# Patient Record
Sex: FEMALE | Race: WHITE | NOT HISPANIC OR LATINO | Employment: OTHER | URBAN - METROPOLITAN AREA
[De-identification: names, ages, dates, MRNs, and addresses within clinical notes are randomized per-mention and may not be internally consistent; named-entity substitution may affect disease eponyms.]

---

## 2022-03-10 ENCOUNTER — OFFICE VISIT (OUTPATIENT)
Dept: URGENT CARE | Facility: CLINIC | Age: 32
End: 2022-03-10
Payer: COMMERCIAL

## 2022-03-10 VITALS
OXYGEN SATURATION: 99 % | SYSTOLIC BLOOD PRESSURE: 144 MMHG | TEMPERATURE: 98 F | HEART RATE: 83 BPM | DIASTOLIC BLOOD PRESSURE: 79 MMHG | RESPIRATION RATE: 16 BRPM | BODY MASS INDEX: 24.99 KG/M2 | WEIGHT: 150 LBS | HEIGHT: 65 IN

## 2022-03-10 DIAGNOSIS — Z20.2 POSSIBLE EXPOSURE TO STD: Primary | ICD-10-CM

## 2022-03-10 LAB
BILIRUB UR QL STRIP: NEGATIVE
GLUCOSE UR QL STRIP: NEGATIVE
KETONES UR QL STRIP: NEGATIVE
LEUKOCYTE ESTERASE UR QL STRIP: NEGATIVE
PH, POC UA: 7.5 (ref 5–8)
POC BLOOD, URINE: POSITIVE
POC NITRATES, URINE: NEGATIVE
PROT UR QL STRIP: NEGATIVE
SP GR UR STRIP: 1 (ref 1–1.03)
UROBILINOGEN UR STRIP-ACNC: ABNORMAL (ref 0.1–1.1)

## 2022-03-10 PROCEDURE — 81003 URINALYSIS AUTO W/O SCOPE: CPT | Mod: QW,S$GLB,, | Performed by: NURSE PRACTITIONER

## 2022-03-10 PROCEDURE — 1159F PR MEDICATION LIST DOCUMENTED IN MEDICAL RECORD: ICD-10-PCS | Mod: CPTII,S$GLB,, | Performed by: NURSE PRACTITIONER

## 2022-03-10 PROCEDURE — 86592 SYPHILIS TEST NON-TREP QUAL: CPT | Performed by: NURSE PRACTITIONER

## 2022-03-10 PROCEDURE — 3078F PR MOST RECENT DIASTOLIC BLOOD PRESSURE < 80 MM HG: ICD-10-PCS | Mod: CPTII,S$GLB,, | Performed by: NURSE PRACTITIONER

## 2022-03-10 PROCEDURE — 81003 POCT URINALYSIS, DIPSTICK, AUTOMATED, W/O SCOPE: ICD-10-PCS | Mod: QW,S$GLB,, | Performed by: NURSE PRACTITIONER

## 2022-03-10 PROCEDURE — 87591 N.GONORRHOEAE DNA AMP PROB: CPT | Performed by: NURSE PRACTITIONER

## 2022-03-10 PROCEDURE — 1160F PR REVIEW ALL MEDS BY PRESCRIBER/CLIN PHARMACIST DOCUMENTED: ICD-10-PCS | Mod: CPTII,S$GLB,, | Performed by: NURSE PRACTITIONER

## 2022-03-10 PROCEDURE — 99203 OFFICE O/P NEW LOW 30 MIN: CPT | Mod: S$GLB,,, | Performed by: NURSE PRACTITIONER

## 2022-03-10 PROCEDURE — 3077F PR MOST RECENT SYSTOLIC BLOOD PRESSURE >= 140 MM HG: ICD-10-PCS | Mod: CPTII,S$GLB,, | Performed by: NURSE PRACTITIONER

## 2022-03-10 PROCEDURE — 3008F PR BODY MASS INDEX (BMI) DOCUMENTED: ICD-10-PCS | Mod: CPTII,S$GLB,, | Performed by: NURSE PRACTITIONER

## 2022-03-10 PROCEDURE — 1160F RVW MEDS BY RX/DR IN RCRD: CPT | Mod: CPTII,S$GLB,, | Performed by: NURSE PRACTITIONER

## 2022-03-10 PROCEDURE — 1159F MED LIST DOCD IN RCRD: CPT | Mod: CPTII,S$GLB,, | Performed by: NURSE PRACTITIONER

## 2022-03-10 PROCEDURE — 3008F BODY MASS INDEX DOCD: CPT | Mod: CPTII,S$GLB,, | Performed by: NURSE PRACTITIONER

## 2022-03-10 PROCEDURE — 99203 PR OFFICE/OUTPT VISIT, NEW, LEVL III, 30-44 MIN: ICD-10-PCS | Mod: S$GLB,,, | Performed by: NURSE PRACTITIONER

## 2022-03-10 PROCEDURE — 3078F DIAST BP <80 MM HG: CPT | Mod: CPTII,S$GLB,, | Performed by: NURSE PRACTITIONER

## 2022-03-10 PROCEDURE — 3077F SYST BP >= 140 MM HG: CPT | Mod: CPTII,S$GLB,, | Performed by: NURSE PRACTITIONER

## 2022-03-10 PROCEDURE — 87389 HIV-1 AG W/HIV-1&-2 AB AG IA: CPT | Performed by: NURSE PRACTITIONER

## 2022-03-10 PROCEDURE — 87491 CHLMYD TRACH DNA AMP PROBE: CPT | Performed by: NURSE PRACTITIONER

## 2022-03-10 RX ORDER — PHENAZOPYRIDINE HYDROCHLORIDE 100 MG/1
100 TABLET, FILM COATED ORAL 3 TIMES DAILY
COMMUNITY
Start: 2022-03-09 | End: 2024-03-19 | Stop reason: CLARIF

## 2022-03-10 RX ORDER — NITROFURANTOIN 25; 75 MG/1; MG/1
100 CAPSULE ORAL 2 TIMES DAILY
COMMUNITY
Start: 2022-03-09 | End: 2024-03-19 | Stop reason: CLARIF

## 2022-03-10 RX ORDER — UBROGEPANT 100 MG/1
100 TABLET ORAL
COMMUNITY
Start: 2022-02-22 | End: 2024-03-19 | Stop reason: CLARIF

## 2022-03-10 RX ORDER — DEXTROAMPHETAMINE SACCHARATE, AMPHETAMINE ASPARTATE MONOHYDRATE, DEXTROAMPHETAMINE SULFATE AND AMPHETAMINE SULFATE 7.5; 7.5; 7.5; 7.5 MG/1; MG/1; MG/1; MG/1
CAPSULE, EXTENDED RELEASE ORAL
COMMUNITY
Start: 2022-02-22 | End: 2024-03-19 | Stop reason: CLARIF

## 2022-03-10 RX ORDER — BUSPIRONE HYDROCHLORIDE 10 MG/1
10 TABLET ORAL 2 TIMES DAILY
COMMUNITY
Start: 2022-02-22 | End: 2024-03-19 | Stop reason: CLARIF

## 2022-03-10 NOTE — PROGRESS NOTES
"Subjective:       Patient ID: Marcy Romo is a 31 y.o. female.    Vitals:  height is 5' 5" (1.651 m) and weight is 68 kg (150 lb). Her oral temperature is 98.4 °F (36.9 °C). Her blood pressure is 144/79 (abnormal) and her pulse is 83. Her respiration is 16 and oxygen saturation is 99%.     Chief Complaint: exposed to std    Patient has been with a new partner and got a UTI.  She is concerned about STD exposure. She denies any current symptoms.       Constitution: Negative.   HENT: Negative.    Cardiovascular: Negative.    Respiratory: Negative.    Gastrointestinal: Negative.    Endocrine: negative.   Genitourinary: Negative.  Negative for frequency and urgency.   Musculoskeletal: Negative.    Skin: Negative.    Allergic/Immunologic: Negative.    Neurological: Negative.    Hematologic/Lymphatic: Negative.    Psychiatric/Behavioral: Negative.        Objective:      Physical Exam   Constitutional: She is oriented to person, place, and time. She appears well-developed.   HENT:   Head: Normocephalic and atraumatic.   Ears:   Right Ear: External ear normal.   Left Ear: External ear normal.   Nose: Nose normal. No nasal deformity. No epistaxis.   Mouth/Throat: Oropharynx is clear and moist and mucous membranes are normal.   Eyes: Lids are normal.   Neck: Trachea normal and phonation normal. Neck supple.   Cardiovascular: Normal pulses.   Pulmonary/Chest: Effort normal.   Abdominal: Normal appearance and bowel sounds are normal. She exhibits no distension. Soft. There is no abdominal tenderness.   Neurological: She is alert and oriented to person, place, and time.   Skin: Skin is warm, dry and intact.   Psychiatric: Her speech is normal and behavior is normal.   Nursing note and vitals reviewed.        Assessment:       1. Possible exposure to STD          Plan:       Patient Instructions   Increase condom use to prevent further occurance.  Notify sexual partners of the need for testing.  Complete ALL medication " prescribed.  NO sexual intercourse for 7 days after treatment. Retest to ensure infection has cleared-there are infections that require more agressive treatment.  Retest for all ini 6 weeks and again in 6 monts to ensure true negative results.  Today's testing will give no crediable information if you have unprotected sexual activities going forward. Syphillis cases are rising!  Gonorrhea has RESISTANT strains which is why repeat testing after treatment is important.  Gonorrhea may be present in multiple sites from just ONE area of exposure.  For those who have high risk sexual behaviors and are on Truvada for PrEP- you have additional protection against HIV ONLY.  REMEMBER WEAR CONDOMS AND GET TESTED OFTEN.          Possible exposure to STD  -     RPR  -     C. trachomatis/N. gonorrhoeae by AMP DNA  -     HIV 1/2 Ag/Ab (4th Gen)

## 2022-03-10 NOTE — PATIENT INSTRUCTIONS
Increase condom use to prevent further occurance.  Notify sexual partners of the need for testing.  Complete ALL medication prescribed.  NO sexual intercourse for 7 days after treatment. Retest to ensure infection has cleared-there are infections that require more agressive treatment.  Retest for all ini 6 weeks and again in 6 monts to ensure true negative results.  Today's testing will give no crediable information if you have unprotected sexual activities going forward. Syphillis cases are rising!  Gonorrhea has RESISTANT strains which is why repeat testing after treatment is important.  Gonorrhea may be present in multiple sites from just ONE area of exposure.  For those who have high risk sexual behaviors and are on Truvada for PrEP- you have additional protection against HIV ONLY.  REMEMBER WEAR CONDOMS AND GET TESTED OFTEN.

## 2022-03-12 LAB — RPR SER QL: NORMAL

## 2022-03-14 LAB — HIV 1+2 AB+HIV1 P24 AG SERPL QL IA: NEGATIVE

## 2022-03-15 LAB
C TRACH DNA SPEC QL NAA+PROBE: NOT DETECTED
N GONORRHOEA DNA SPEC QL NAA+PROBE: NOT DETECTED

## 2023-02-06 ENCOUNTER — OFFICE VISIT (OUTPATIENT)
Dept: URGENT CARE | Facility: CLINIC | Age: 33
End: 2023-02-06
Payer: COMMERCIAL

## 2023-02-06 VITALS
HEIGHT: 65 IN | HEART RATE: 86 BPM | WEIGHT: 150 LBS | SYSTOLIC BLOOD PRESSURE: 127 MMHG | TEMPERATURE: 97 F | OXYGEN SATURATION: 99 % | DIASTOLIC BLOOD PRESSURE: 75 MMHG | BODY MASS INDEX: 24.99 KG/M2

## 2023-02-06 DIAGNOSIS — J02.9 SORE THROAT: ICD-10-CM

## 2023-02-06 DIAGNOSIS — J06.9 VIRAL URI WITH COUGH: Primary | ICD-10-CM

## 2023-02-06 LAB
CTP QC/QA: YES
SARS-COV-2 AG RESP QL IA.RAPID: NEGATIVE

## 2023-02-06 PROCEDURE — 3074F SYST BP LT 130 MM HG: CPT | Mod: CPTII,S$GLB,, | Performed by: PHYSICIAN ASSISTANT

## 2023-02-06 PROCEDURE — 87811 SARS-COV-2 COVID19 W/OPTIC: CPT | Mod: QW,S$GLB,, | Performed by: PHYSICIAN ASSISTANT

## 2023-02-06 PROCEDURE — 3008F PR BODY MASS INDEX (BMI) DOCUMENTED: ICD-10-PCS | Mod: CPTII,S$GLB,, | Performed by: PHYSICIAN ASSISTANT

## 2023-02-06 PROCEDURE — 3074F PR MOST RECENT SYSTOLIC BLOOD PRESSURE < 130 MM HG: ICD-10-PCS | Mod: CPTII,S$GLB,, | Performed by: PHYSICIAN ASSISTANT

## 2023-02-06 PROCEDURE — 1159F MED LIST DOCD IN RCRD: CPT | Mod: CPTII,S$GLB,, | Performed by: PHYSICIAN ASSISTANT

## 2023-02-06 PROCEDURE — 3078F PR MOST RECENT DIASTOLIC BLOOD PRESSURE < 80 MM HG: ICD-10-PCS | Mod: CPTII,S$GLB,, | Performed by: PHYSICIAN ASSISTANT

## 2023-02-06 PROCEDURE — 87811 SARS CORONAVIRUS 2 ANTIGEN POCT, MANUAL READ: ICD-10-PCS | Mod: QW,S$GLB,, | Performed by: PHYSICIAN ASSISTANT

## 2023-02-06 PROCEDURE — 1160F PR REVIEW ALL MEDS BY PRESCRIBER/CLIN PHARMACIST DOCUMENTED: ICD-10-PCS | Mod: CPTII,S$GLB,, | Performed by: PHYSICIAN ASSISTANT

## 2023-02-06 PROCEDURE — 99213 PR OFFICE/OUTPT VISIT, EST, LEVL III, 20-29 MIN: ICD-10-PCS | Mod: S$GLB,,, | Performed by: PHYSICIAN ASSISTANT

## 2023-02-06 PROCEDURE — 1160F RVW MEDS BY RX/DR IN RCRD: CPT | Mod: CPTII,S$GLB,, | Performed by: PHYSICIAN ASSISTANT

## 2023-02-06 PROCEDURE — 1159F PR MEDICATION LIST DOCUMENTED IN MEDICAL RECORD: ICD-10-PCS | Mod: CPTII,S$GLB,, | Performed by: PHYSICIAN ASSISTANT

## 2023-02-06 PROCEDURE — 3008F BODY MASS INDEX DOCD: CPT | Mod: CPTII,S$GLB,, | Performed by: PHYSICIAN ASSISTANT

## 2023-02-06 PROCEDURE — 3078F DIAST BP <80 MM HG: CPT | Mod: CPTII,S$GLB,, | Performed by: PHYSICIAN ASSISTANT

## 2023-02-06 PROCEDURE — 99213 OFFICE O/P EST LOW 20 MIN: CPT | Mod: S$GLB,,, | Performed by: PHYSICIAN ASSISTANT

## 2023-02-06 RX ORDER — PREDNISONE 10 MG/1
TABLET ORAL
Qty: 11 TABLET | Refills: 0 | OUTPATIENT
Start: 2023-02-06 | End: 2024-03-20

## 2023-02-06 RX ORDER — DULOXETIN HYDROCHLORIDE 30 MG/1
30 CAPSULE, DELAYED RELEASE ORAL DAILY
COMMUNITY
Start: 2022-11-25

## 2023-02-06 RX ORDER — BENZONATATE 200 MG/1
200 CAPSULE ORAL 3 TIMES DAILY PRN
Qty: 30 CAPSULE | Refills: 0 | Status: SHIPPED | OUTPATIENT
Start: 2023-02-06 | End: 2023-02-16

## 2023-02-06 RX ORDER — AZELASTINE 1 MG/ML
1 SPRAY, METERED NASAL 2 TIMES DAILY
Qty: 30 ML | Refills: 0 | Status: SHIPPED | OUTPATIENT
Start: 2023-02-06 | End: 2024-03-19 | Stop reason: CLARIF

## 2023-02-06 NOTE — PROGRESS NOTES
"Subjective:       Patient ID: Marcy Romo is a 32 y.o. female.    Vitals:  height is 5' 5" (1.651 m) and weight is 68 kg (150 lb). Her temperature is 97.4 °F (36.3 °C). Her blood pressure is 127/75 and her pulse is 86. Her oxygen saturation is 99%.     Chief Complaint: Sore Throat    Sore throat, cough, congestion, fever 102- 2-3 days ago   Patient has been exposed to COVID at work recently  Patient has not tried any OTC medications for relief.     Other  This is a new problem. The current episode started in the past 7 days. The problem has been gradually worsening. Associated symptoms include congestion, coughing, a fever, headaches and a sore throat. She has tried nothing for the symptoms. The treatment provided no relief.     Constitution: Positive for fever.   HENT:  Positive for congestion and sore throat.    Respiratory:  Positive for cough. Negative for shortness of breath.    Neurological:  Positive for headaches.     Objective:      Physical Exam   Constitutional: She does not appear ill. No distress.   HENT:   Head: Normocephalic and atraumatic.   Ears:   Right Ear: Tympanic membrane, external ear and ear canal normal.   Left Ear: Tympanic membrane, external ear and ear canal normal.   Mouth/Throat: Mucous membranes are moist. No oropharyngeal exudate or posterior oropharyngeal erythema. Oropharynx is clear.   Eyes: Conjunctivae are normal. Right eye exhibits no discharge. Left eye exhibits no discharge. Extraocular movement intact   Cardiovascular: Normal rate, regular rhythm and normal heart sounds.   No murmur heard.  Pulmonary/Chest: Effort normal and breath sounds normal. She has no wheezes. She has no rhonchi. She has no rales.   Abdominal: Normal appearance.   Musculoskeletal: Normal range of motion.         General: Normal range of motion.   Neurological: no focal deficit. She is alert.   Skin: Skin is warm, dry and not pale. jaundice  Psychiatric: Her behavior is normal. Mood, judgment and " thought content normal.   Nursing note and vitals reviewed.      Assessment:       1. Viral URI with cough    2. Sore throat          Plan:         Viral URI with cough    Sore throat  -     SARS Coronavirus 2 Antigen, POCT Manual Read    Results for orders placed or performed in visit on 02/06/23   SARS Coronavirus 2 Antigen, POCT Manual Read   Result Value Ref Range    SARS Coronavirus 2 Antigen Negative Negative     Acceptable Yes         Other orders  -     predniSONE (DELTASONE) 10 MG tablet; Take 40mg for 1 days, take 30mg for 1 days, take 20mg for 1 days, take 10mg for 2 days  Dispense: 11 tablet; Refill: 0  -     azelastine (ASTELIN) 137 mcg (0.1 %) nasal spray; 1 spray (137 mcg total) by Nasal route 2 (two) times daily.  Dispense: 30 mL; Refill: 0  -     benzonatate (TESSALON) 200 MG capsule; Take 1 capsule (200 mg total) by mouth 3 (three) times daily as needed for Cough.  Dispense: 30 capsule; Refill: 0    Viral Upper Respiratory Infection Discharge Instructions, Adult   About this topic   You have an upper respiratory infection or URI. A URI can affect your nose, throat, ears, and sinuses. A virus is the cause of almost all URIs and antibiotics will not help you feel better more quickly. The common cold is an example of a viral URI.  URIs are easy to spread from person to person, most often through coughing or sneezing. A URI will almost always get better in a week or two without any treatment.     What care is needed at home?   Ask your doctor what you need to do when you go home. Make sure you ask questions if you do not understand what the doctor says.  If you smoke, try to quit. Your doctor or nurse can help.  Drink lots of fluids like water, juice, or broth. This will help replace any fluids lost if you have a runny nose or fever. Warm tea or soup can help soothe a sore throat.  If the air in your home feels dry, use a cool mist humidifier. This can help a stuffy nose and make it  easier to breathe.  You can also use saline nose drops to relieve stuffiness.  If you decide to take over-the-counter cough or cold medicines, follow the directions on the label carefully. Be sure you do not take more than 1 medicine that contains acetaminophen. Also, if you have a heart problem or high blood pressure, check with your doctor before you take any of these medicines.  Wash your hands often. Cough or sneeze into a tissue or your elbow instead of your hands. This will help keep others healthy.  What follow-up care is needed?   Your doctor may ask you to make visits to the office to check on your progress. Be sure to keep these visits.  What drugs may be needed?   The doctor may order drugs to:  Open up the tubes of your lungs  Treat viral infection  Relieve or stop coughing  Help with pain from a sore throat  Relieve runny and stuffy nose  Provide oxygen  Will physical activity be limited?   You need to rest for a few days to let your body recover from the infection.  What changes to diet are needed?   Eat soft foods like soup if swallowing is too painful.  What problems could happen?   Asthma attack  Sinus infections  Lung problems like pneumonia and bronchitis  Severe fluid loss. This is dehydration.  What can be done to prevent this health problem?   Wash your hands often with soap and water for at least 20 seconds, especially after coughing or sneezing. Alcohol-based hand sanitizers also work to kill the virus.  If you are sick, cover your mouth and nose with tissue when you cough or sneeze. You can also cough into your elbow. Throw away tissues in the trash and wash your hands after touching used tissues.  Do not get too close (kissing, hugging) to people who are sick.  Do not share towels or hankies with anyone who is sick. Clean commonly handled things like door handles, remotes, toys, and phones. Wipe them with a disinfectant.  Stay away from crowded places.  Cover your nose and mouth when you  sneeze or cough.  Take vitamin C to help build up your body's ability to fight disease.  Get a flu shot each year.  When do I need to call the doctor?   You have trouble breathing when talking or sitting still.  You have a fever of 100.4°F (38°C) or higher for several days, chills, a very bad sore throat, or ear or sinus pain.  You develop a new fever after several days of feeling the same or improving.  You develop chest pain when you cough.  You have a cough that lasts more than 10 days.  You cough up blood, or the color of the mucus you cough up changes.  Teach Back: Helping You Understand   The Teach Back Method helps you understand the information we are giving you. After you talk with the staff, tell them in your own words what you learned. This helps to make sure the staff has described each thing clearly. It also helps to explain things that may have been confusing. Before going home, make sure you can do these:  I can tell you about my condition.  I can tell you what may help ease my signs.  I can tell you what I will do if I have a fever, chills, breathing very fast, or trouble breathing.  Where can I learn more?   American Lung Association  https://www.lung.org/blog/can-you-exercise-with-a-cold   American Lung Association  https://www.lung.org/lung-health-diseases/lung-disease-lookup/influenza/facts-about-the-common-cold   NHS Choices  https://www.nhs.uk/conditions/respiratory-tract-infection/   UpToDate  https://www.uptodate.com/contents/the-common-cold-in-adults-beyond-the-basics   Last Reviewed Date   2021-06-08  Consumer Information Use and Disclaimer   This information is not specific medical advice and does not replace information you receive from your health care provider. This is only a brief summary of general information. It does NOT include all information about conditions, illnesses, injuries, tests, procedures, treatments, therapies, discharge instructions or life-style choices that may apply  to you. You must talk with your health care provider for complete information about your health and treatment options. This information should not be used to decide whether or not to accept your health care providers advice, instructions or recommendations. Only your health care provider has the knowledge and training to provide advice that is right for you.  Copyright   Copyright © 2021 [employer]To[employer] and its affiliates and/or licensors. All rights reserved.

## 2023-03-23 ENCOUNTER — OFFICE VISIT (OUTPATIENT)
Dept: URGENT CARE | Facility: CLINIC | Age: 33
End: 2023-03-23
Payer: MEDICAID

## 2023-03-23 VITALS
RESPIRATION RATE: 18 BRPM | DIASTOLIC BLOOD PRESSURE: 83 MMHG | SYSTOLIC BLOOD PRESSURE: 127 MMHG | WEIGHT: 140 LBS | BODY MASS INDEX: 23.32 KG/M2 | OXYGEN SATURATION: 98 % | HEIGHT: 65 IN | HEART RATE: 86 BPM | TEMPERATURE: 98 F

## 2023-03-23 DIAGNOSIS — A64 STI (SEXUALLY TRANSMITTED INFECTION): Primary | ICD-10-CM

## 2023-03-23 DIAGNOSIS — Z20.2 POTENTIAL EXPOSURE TO STD: ICD-10-CM

## 2023-03-23 LAB
BILIRUB UR QL STRIP: NEGATIVE
GLUCOSE UR QL STRIP: NEGATIVE
KETONES UR QL STRIP: NEGATIVE
LEUKOCYTE ESTERASE UR QL STRIP: NEGATIVE
PH, POC UA: 8
POC BLOOD, URINE: NEGATIVE
POC NITRATES, URINE: NEGATIVE
PROT UR QL STRIP: NEGATIVE
SP GR UR STRIP: 1.01 (ref 1–1.03)
UROBILINOGEN UR STRIP-ACNC: NORMAL (ref 0.1–1.1)

## 2023-03-23 PROCEDURE — 87591 N.GONORRHOEAE DNA AMP PROB: CPT | Performed by: FAMILY MEDICINE

## 2023-03-23 PROCEDURE — 99203 PR OFFICE/OUTPT VISIT, NEW, LEVL III, 30-44 MIN: ICD-10-PCS | Mod: S$GLB,,, | Performed by: FAMILY MEDICINE

## 2023-03-23 PROCEDURE — 81003 POCT URINALYSIS, DIPSTICK, AUTOMATED, W/O SCOPE: ICD-10-PCS | Mod: QW,S$GLB,, | Performed by: FAMILY MEDICINE

## 2023-03-23 PROCEDURE — 81003 URINALYSIS AUTO W/O SCOPE: CPT | Mod: QW,S$GLB,, | Performed by: FAMILY MEDICINE

## 2023-03-23 PROCEDURE — 99203 OFFICE O/P NEW LOW 30 MIN: CPT | Mod: S$GLB,,, | Performed by: FAMILY MEDICINE

## 2023-03-23 RX ORDER — DOXYCYCLINE HYCLATE 100 MG
100 TABLET ORAL DAILY
Qty: 6 TABLET | Refills: 0 | Status: SHIPPED | OUTPATIENT
Start: 2023-03-23 | End: 2023-03-28

## 2023-03-23 RX ORDER — CEFTRIAXONE 250 MG/1
250 INJECTION, POWDER, FOR SOLUTION INTRAMUSCULAR; INTRAVENOUS
Status: COMPLETED | OUTPATIENT
Start: 2023-03-23 | End: 2023-03-23

## 2023-03-23 RX ORDER — DROSPIRENONE AND ETHINYL ESTRADIOL 0.03MG-3MG
1 KIT ORAL
COMMUNITY
Start: 2023-03-09 | End: 2024-03-19 | Stop reason: CLARIF

## 2023-03-23 RX ADMIN — CEFTRIAXONE 250 MG: 250 INJECTION, POWDER, FOR SOLUTION INTRAMUSCULAR; INTRAVENOUS at 03:03

## 2023-03-23 NOTE — PROGRESS NOTES
"Subjective:       Patient ID: Marcy Romo is a 32 y.o. female.    Vitals:  height is 5' 5" (1.651 m) and weight is 63.5 kg (140 lb). Her oral temperature is 98 °F (36.7 °C). Her blood pressure is 127/83 and her pulse is 86. Her respiration is 18 and oxygen saturation is 98%.     Chief Complaint: Exposure to STD    This is a 32 y.o. female who presents today with a chief complaint of  exposure to STI    Exposure to STD   The patient's pertinent negatives include no discharge, dyspareunia, dysuria, genital itching, genital lesions, genital rash or pelvic pain. This is a new problem. The current episode started yesterday. The problem has been unchanged. The vaginal discharge was normal. Associate symptoms include urinary frequency. Pertinent negatives include no abdominal pain, anorexia, diaphoresis, fever, genital odor, rectal pain or sore throat. She has tried nothing for the symptoms. The treatment provided no relief.     Constitution: Negative for sweating and fever.   HENT:  Negative for sore throat.    Gastrointestinal:  Negative for abdominal pain and rectal pain.   Genitourinary:  Positive for frequency. Negative for dysuria and pelvic pain.     Objective:      Physical Exam   Constitutional: She is oriented to person, place, and time. She appears well-developed. She is cooperative.  Non-toxic appearance. She does not appear ill. No distress.   HENT:   Head: Normocephalic and atraumatic.   Ears:   Right Ear: Hearing, tympanic membrane, external ear and ear canal normal.   Left Ear: Hearing, tympanic membrane and external ear normal.   Nose: Nose normal. No mucosal edema, rhinorrhea or nasal deformity. No epistaxis. Right sinus exhibits no maxillary sinus tenderness and no frontal sinus tenderness. Left sinus exhibits no maxillary sinus tenderness and no frontal sinus tenderness.   Mouth/Throat: Uvula is midline, oropharynx is clear and moist and mucous membranes are normal. No trismus in the jaw. Normal " dentition. No uvula swelling. No oropharyngeal exudate, posterior oropharyngeal edema or posterior oropharyngeal erythema.   Eyes: Conjunctivae and lids are normal. No scleral icterus.   Neck: Trachea normal and phonation normal. Neck supple. No edema present. No erythema present. No neck rigidity present.   Cardiovascular: Normal rate, regular rhythm, normal heart sounds and normal pulses.   Pulmonary/Chest: Effort normal and breath sounds normal. No respiratory distress. She has no decreased breath sounds. She has no rhonchi.   Abdominal: Normal appearance and bowel sounds are normal. She exhibits no distension. Soft. There is no abdominal tenderness.   Musculoskeletal: Normal range of motion.         General: No deformity. Normal range of motion.   Neurological: She is alert and oriented to person, place, and time. She exhibits normal muscle tone. Coordination normal.   Skin: Skin is warm, dry, intact, not diaphoretic and not pale.   Psychiatric: Her speech is normal and behavior is normal. Judgment and thought content normal.   Nursing note and vitals reviewed.      Assessment:       1. STI (sexually transmitted infection)    2. Potential exposure to STD          Plan:         STI (sexually transmitted infection)  -     POCT Urinalysis, Dipstick, Automated, W/O Scope  -     Cancel: C. trachomatis/N. gonorrhoeae by AMP DNA [facility]; Urine  -     Cancel: NuSwab Vaginitis (VG)  -     C. trachomatis/N. gonorrhoeae by AMP DNA [facility]; Urine    Potential exposure to STD  -     cefTRIAXone injection 250 mg  -     doxycycline (VIBRA-TABS) 100 MG tablet; Take 1 tablet (100 mg total) by mouth once daily. Take one pill twice daily on day 1 then once daily thereafter for 5 days  Dispense: 6 tablet; Refill: 0           Medical Decision Making:   Urgent Care Management:  Pt would like treatment due to her partner being treated before his test came back which then invalidated his results from his subsequent test

## 2023-03-24 LAB
C TRACH DNA SPEC QL NAA+PROBE: NOT DETECTED
N GONORRHOEA DNA SPEC QL NAA+PROBE: NOT DETECTED

## 2023-03-25 ENCOUNTER — TELEPHONE (OUTPATIENT)
Dept: URGENT CARE | Facility: CLINIC | Age: 33
End: 2023-03-25
Payer: MEDICAID

## 2023-03-25 NOTE — TELEPHONE ENCOUNTER
Contacted patient and discussed negative gonorrhea/chlamydia results. No questions or concerns at this time.

## 2024-03-19 PROBLEM — G89.29 CHRONIC HEADACHES: Status: ACTIVE | Noted: 2024-03-19

## 2024-03-19 PROBLEM — R56.9 OBSERVED SEIZURE-LIKE ACTIVITY: Status: ACTIVE | Noted: 2024-03-19

## 2024-03-19 PROBLEM — R51.9 CHRONIC HEADACHES: Status: ACTIVE | Noted: 2024-03-19

## 2024-03-19 PROBLEM — R74.01 TRANSAMINITIS: Status: ACTIVE | Noted: 2024-03-19

## 2024-03-19 PROBLEM — F41.9 ANXIETY DISORDER: Status: ACTIVE | Noted: 2024-03-19

## 2024-03-20 PROBLEM — R79.89 ELEVATED LFTS: Status: ACTIVE | Noted: 2024-03-20

## 2024-03-20 PROBLEM — R74.8 ELEVATED LIVER ENZYMES: Status: ACTIVE | Noted: 2024-03-20

## 2024-03-20 PROBLEM — F44.5 CONVERSION DISORDER WITH ATTACKS OR SEIZURES: Status: ACTIVE | Noted: 2024-03-20

## 2024-05-03 NOTE — PROGRESS NOTES
PATIENT: Marcy Romo  MRN: [id number]  DATE: 5/6/2024    Diagnosis:   1. Elevated ferritin    2. Elevated hemoglobin    3. Lupus    4. Positive STEVAN (antinuclear antibody)    5. Elevated liver enzymes    6. Thrombocytosis, unspecified    7. Advance care planning      Chief Complaint: abnormal labs    Oncologic History:      Oncologic History     Oncologic Treatment     Pathology       Subjective:    History of Present Illness: Ms. Romo is a 33 y.o. female who presents for evaluation and management of abnormal labs.    - she states the referral is for intermittently elevated ferritin/hemoglobin. She states she was recently diagnosed with lupus and started plaquenil. She reports having lymphadenopathy that waxes and wanes, in her groin area.    - she denies fever, unintentional weight loss. She endorses night sweats.    - today, she endorses fatigue, menorrhagia, intermittent chest pain, nausea, vomiting, diarrhea, left leg pain, migraines      Past medical, surgical, family, and social histories have been reviewed and updated below.    Past Medical History:   Past Medical History:   Diagnosis Date    ADHD (attention deficit hyperactivity disorder)     Allergy     Anxiety     Asthma     Depression     Hyperlipidemia        Past Surgical History:   Past Surgical History:   Procedure Laterality Date    APPENDECTOMY      HAND SURGERY Right     HYSTERECTOMY      KNEE ARTHROSCOPY W/ ACL RECONSTRUCTION         Family History:   Family History   Problem Relation Name Age of Onset    Arthritis Mother      Alcohol abuse Mother      Drug abuse Mother      COPD Mother      Mental illness Mother      Hypertension Mother      Alcohol abuse Father      Heart disease Father      Diabetes Father         Social History:  reports that she has never smoked. She has been exposed to tobacco smoke. She has never used smokeless tobacco. She reports that she does not currently use alcohol. She reports current drug use.    Allergies:  Review of  "patient's allergies indicates:   Allergen Reactions    Cymbalta [duloxetine] Other (See Comments)     "Makes me crazy"    Topamax [topiramate] Other (See Comments)     "Makes me crazy"    Tramadol Nausea And Vomiting       Medications:  Current Outpatient Medications   Medication Sig Dispense Refill    celecoxib (CELEBREX) 200 MG capsule Take 1 capsule (200 mg total) by mouth daily as needed for Pain (pain). 90 capsule 0    citalopram (CELEXA) 20 MG tablet Take 20 mg by mouth once daily.      clonazePAM (KLONOPIN) 1 MG tablet Take 1 mg by mouth nightly as needed for Anxiety.      dextroamphetamine-amphetamine 30 mg Tab Take 30 mg by mouth 2 (two) times daily.      fluticasone propionate (FLONASE) 50 mcg/actuation nasal spray 1 spray by Each Nostril route once daily.      gabapentin (NEURONTIN) 100 MG capsule Take 1 capsule (100 mg total) by mouth 3 (three) times daily as needed (nerve pain). 270 capsule 0    hydroxychloroquine (PLAQUENIL) 200 mg tablet Take 1 tablet (200 mg total) by mouth once daily. 90 tablet 0    lamoTRIgine (LAMICTAL) 25 MG tablet Take 50 mg by mouth 2 (two) times daily.      linaCLOtide (LINZESS) 72 mcg Cap capsule Take 1 capsule (72 mcg total) by mouth daily as needed (constipation). 90 capsule 0    meloxicam (MOBIC) 15 MG tablet Take 15 mg by mouth once daily.      montelukast (SINGULAIR) 10 mg tablet Take 10 mg by mouth every evening.      NURTEC 75 mg odt Take 75 mg by mouth daily as needed for Migraine.      omeprazole (PRILOSEC) 20 MG capsule Take 1 capsule (20 mg total) by mouth once daily. 90 capsule 0    ondansetron (ZOFRAN-ODT) 4 MG TbDL Take 4 mg by mouth every 8 (eight) hours as needed (nausea).      prazosin (MINIPRESS) 1 MG Cap Take 1 mg by mouth every evening.      rizatriptan (MAXALT) 10 MG tablet Take 10 mg by mouth once as needed for Migraine.      sumatriptan (IMITREX) 100 MG tablet Take 100 mg by mouth daily as needed for Migraine.      VENTOLIN HFA 90 mcg/actuation inhaler " Inhale 1-2 puffs into the lungs every 6 (six) hours as needed for Wheezing or Shortness of Breath.       No current facility-administered medications for this visit.       Review of Systems   Constitutional:  Positive for appetite change and unexpected weight change.   HENT:  Negative for mouth sores.    Eyes:  Positive for visual disturbance.   Respiratory:  Negative for cough and shortness of breath.    Cardiovascular:  Positive for chest pain.   Gastrointestinal:  Positive for abdominal pain, diarrhea and vomiting.   Genitourinary:  Negative for frequency.   Musculoskeletal:  Positive for back pain.   Skin:  Positive for rash.   Neurological:  Positive for headaches.   Hematological:  Positive for adenopathy.   Psychiatric/Behavioral:  The patient is nervous/anxious.        ECOG Performance Status:   ECOG SCORE    1 - Restricted in strenuous activity-ambulatory and able to carry out work of a light nature         Objective:      Vitals:   Vitals:    05/06/24 0834   BP: 131/89   BP Location: Right arm   Patient Position: Sitting   BP Method: Medium (Automatic)   Pulse: 85   Resp: 18   SpO2: 98%   Weight: 68.8 kg (151 lb 10.8 oz)     BMI: Body mass index is 25.24 kg/m².    Physical Exam  Vitals and nursing note reviewed.   Constitutional:       Appearance: She is well-developed.   HENT:      Head: Normocephalic and atraumatic.   Eyes:      Pupils: Pupils are equal, round, and reactive to light.   Cardiovascular:      Rate and Rhythm: Normal rate and regular rhythm.   Pulmonary:      Effort: Pulmonary effort is normal.      Breath sounds: Normal breath sounds.   Abdominal:      General: Bowel sounds are normal.      Palpations: Abdomen is soft.   Musculoskeletal:         General: Normal range of motion.      Cervical back: Normal range of motion and neck supple.   Lymphadenopathy:      Cervical: Cervical adenopathy present.      Lower Body: Right inguinal adenopathy (pea-sized lymph node palpated) present.   Skin:      General: Skin is warm and dry.   Neurological:      Mental Status: She is alert and oriented to person, place, and time.   Psychiatric:         Behavior: Behavior normal.         Thought Content: Thought content normal.         Judgment: Judgment normal.         Laboratory Data:  Labs have been reviewed.    2/20/24:      2/28/24:      3/25/24:          Imaging:    Assessment:       1. Elevated hemoglobin    2. Lupus    3. Positive STEVAN (antinuclear antibody)    4. Elevated liver enzymes    5. Thrombocytosis, unspecified    6. Advance care planning           Plan:     Elevated ferritin / elevated hemoglobin / thrombocytosis / elevated liver enzymes / lupus / immunodeficiency due to drugs / lymphadenopathy  - I have reviewed her chart.  - complicated medical history. She has multiple issues, including lupus/ lymphadenopathy, elevated liver enzymes, mildly elevated ferritin, intermittent thrombocytosis, history of seizures. She also states she has myositis.  - I think we can say confidently that she has lupus. Unclear if the other issues are related to this or separate entities  - she is currently taking prednisone 10mg TID.  - I will perform a broad laboratory workup.  - I will contact her with results of testing.    2. Advance Care Planning     Date: 05/06/2024    Power of   I initiated the process of voluntary advance care planning today and explained the importance of this process to the patient.  I introduced the concept of advance directives to the patient, as well. Then the patient received detailed information about the importance of designating a Health Care Power of  (HCPOA). She was also instructed to communicate with this person about their wishes for future healthcare, should she become sick and lose decision-making capacity. The patient has not previously appointed a HCPOA. After our discussion, the patient has decided to complete a HCPOA and has appointed her  partner Nataliya Tran  ([phone]) . I spent a total time of 16 minutes discussing this issue with the patient.          - I will contact her with results of testing.    Ethan Talamantes M.D.  Hematology/Oncology  [facility] - [address], [address]  [place] [address]  Phone: [phone]  Fax: [phone]

## 2024-05-06 ENCOUNTER — OFFICE VISIT (OUTPATIENT)
Dept: HEMATOLOGY/ONCOLOGY | Facility: CLINIC | Age: 34
End: 2024-05-06
Payer: MEDICAID

## 2024-05-06 VITALS
WEIGHT: 151.69 LBS | HEART RATE: 85 BPM | BODY MASS INDEX: 25.24 KG/M2 | DIASTOLIC BLOOD PRESSURE: 89 MMHG | OXYGEN SATURATION: 98 % | RESPIRATION RATE: 18 BRPM | SYSTOLIC BLOOD PRESSURE: 131 MMHG

## 2024-05-06 DIAGNOSIS — D75.839 THROMBOCYTOSIS, UNSPECIFIED: ICD-10-CM

## 2024-05-06 DIAGNOSIS — Z71.89 ADVANCE CARE PLANNING: ICD-10-CM

## 2024-05-06 DIAGNOSIS — D58.2 ELEVATED HEMOGLOBIN: ICD-10-CM

## 2024-05-06 DIAGNOSIS — M32.9 LUPUS: ICD-10-CM

## 2024-05-06 DIAGNOSIS — R74.8 ELEVATED LIVER ENZYMES: ICD-10-CM

## 2024-05-06 DIAGNOSIS — R79.89 ELEVATED FERRITIN: Primary | ICD-10-CM

## 2024-05-06 DIAGNOSIS — R76.8 POSITIVE ANA (ANTINUCLEAR ANTIBODY): ICD-10-CM

## 2024-05-06 PROCEDURE — 99497 ADVNCD CARE PLAN 30 MIN: CPT | Mod: PBBFAC,PN | Performed by: INTERNAL MEDICINE

## 2024-05-06 PROCEDURE — 3075F SYST BP GE 130 - 139MM HG: CPT | Mod: CPTII,,, | Performed by: INTERNAL MEDICINE

## 2024-05-06 PROCEDURE — 99999 PR PBB SHADOW E&M-EST. PATIENT-LVL IV: CPT | Mod: PBBFAC,,, | Performed by: INTERNAL MEDICINE

## 2024-05-06 PROCEDURE — 1160F RVW MEDS BY RX/DR IN RCRD: CPT | Mod: CPTII,,, | Performed by: INTERNAL MEDICINE

## 2024-05-06 PROCEDURE — 99214 OFFICE O/P EST MOD 30 MIN: CPT | Mod: PBBFAC,PN,25 | Performed by: INTERNAL MEDICINE

## 2024-05-06 PROCEDURE — 99204 OFFICE O/P NEW MOD 45 MIN: CPT | Mod: S$PBB,,, | Performed by: INTERNAL MEDICINE

## 2024-05-06 PROCEDURE — 99497 ADVNCD CARE PLAN 30 MIN: CPT | Mod: S$PBB,,, | Performed by: INTERNAL MEDICINE

## 2024-05-06 PROCEDURE — 3044F HG A1C LEVEL LT 7.0%: CPT | Mod: CPTII,,, | Performed by: INTERNAL MEDICINE

## 2024-05-06 PROCEDURE — 3008F BODY MASS INDEX DOCD: CPT | Mod: CPTII,,, | Performed by: INTERNAL MEDICINE

## 2024-05-06 PROCEDURE — 3079F DIAST BP 80-89 MM HG: CPT | Mod: CPTII,,, | Performed by: INTERNAL MEDICINE

## 2024-05-06 PROCEDURE — 1159F MED LIST DOCD IN RCRD: CPT | Mod: CPTII,,, | Performed by: INTERNAL MEDICINE

## 2024-05-10 ENCOUNTER — TELEPHONE (OUTPATIENT)
Dept: HEMATOLOGY/ONCOLOGY | Facility: CLINIC | Age: 34
End: 2024-05-10
Payer: MEDICAID

## 2024-05-10 NOTE — TELEPHONE ENCOUNTER
I called and left a voicemail. Workup this was came back basically normal. Platelet count, ferritin level were normal. Hemochromatosis testing was normal. No further workup at this time.    Ethan Talamantes M.D.  Hematology/Oncology  [facility] - [address], [address]  [place] [address]  Phone: [phone]  Fax: [phone]

## 2024-06-05 ENCOUNTER — OFFICE VISIT (OUTPATIENT)
Dept: OPHTHALMOLOGY | Facility: CLINIC | Age: 34
End: 2024-06-05
Payer: MEDICAID

## 2024-06-05 ENCOUNTER — TELEPHONE (OUTPATIENT)
Dept: OPHTHALMOLOGY | Facility: CLINIC | Age: 34
End: 2024-06-05
Payer: MEDICAID

## 2024-06-05 DIAGNOSIS — S05.02XA ABRASION OF LEFT CORNEA, INITIAL ENCOUNTER: Primary | ICD-10-CM

## 2024-06-05 PROCEDURE — 1160F RVW MEDS BY RX/DR IN RCRD: CPT | Mod: CPTII,,, | Performed by: OPHTHALMOLOGY

## 2024-06-05 PROCEDURE — 3044F HG A1C LEVEL LT 7.0%: CPT | Mod: CPTII,,, | Performed by: OPHTHALMOLOGY

## 2024-06-05 PROCEDURE — 92004 COMPRE OPH EXAM NEW PT 1/>: CPT | Mod: S$PBB,,, | Performed by: OPHTHALMOLOGY

## 2024-06-05 PROCEDURE — 99999 PR PBB SHADOW E&M-EST. PATIENT-LVL II: CPT | Mod: PBBFAC,,, | Performed by: OPHTHALMOLOGY

## 2024-06-05 PROCEDURE — 99212 OFFICE O/P EST SF 10 MIN: CPT | Mod: PBBFAC | Performed by: OPHTHALMOLOGY

## 2024-06-05 PROCEDURE — 1159F MED LIST DOCD IN RCRD: CPT | Mod: CPTII,,, | Performed by: OPHTHALMOLOGY

## 2024-06-05 NOTE — PROGRESS NOTES
HPI    Patient present today for urgent ED f/u   Pt state she has lupus really bad, an fainted.   Plaq. Medication  has been making vision really blurry OS   Pt is seeing black spots. Painful eye OS     Oflox qid OS   Last edited by Alise Lee on 6/5/2024  1:56 PM.            Assessment /Plan     For exam results, see Encounter Report.    Abrasion of left cornea, initial encounter      Hx injury in Jan and now with second corneal erosion, typical first morning pain  Second episode, so plan for SK with dar osman when ready.  Diff exam today due to pain.

## 2024-06-05 NOTE — TELEPHONE ENCOUNTER
----- Message from Shaun Erickson sent at 6/5/2024 10:08 AM CDT -----  Regarding: appiontment access  Contact: [phone] or [phone]  Pt is calling to get scheduled to see an Ophthalmology . Please contact pt with appointment . Pt was seen in the emergency room and was referred to be seen.

## 2024-06-05 NOTE — TELEPHONE ENCOUNTER
----- Message from Shaun Erickson sent at 6/5/2024 11:25 AM CDT -----  Regarding: appopointment access  Contact: [phone]  Pt was in ER all night and they recommended pt sees ophthalmology. Pt called earlier to get an appointment. Please respond to pt .

## 2024-06-11 ENCOUNTER — OFFICE VISIT (OUTPATIENT)
Dept: OPHTHALMOLOGY | Facility: CLINIC | Age: 34
End: 2024-06-11
Payer: MEDICAID

## 2024-06-11 DIAGNOSIS — S05.02XA ABRASION OF LEFT CORNEA, INITIAL ENCOUNTER: Primary | ICD-10-CM

## 2024-06-11 DIAGNOSIS — H18.832 RECURRENT EROSION OF LEFT CORNEA: ICD-10-CM

## 2024-06-11 PROCEDURE — 1160F RVW MEDS BY RX/DR IN RCRD: CPT | Mod: CPTII,,, | Performed by: OPHTHALMOLOGY

## 2024-06-11 PROCEDURE — 3044F HG A1C LEVEL LT 7.0%: CPT | Mod: CPTII,,, | Performed by: OPHTHALMOLOGY

## 2024-06-11 PROCEDURE — 99212 OFFICE O/P EST SF 10 MIN: CPT | Mod: PBBFAC | Performed by: OPHTHALMOLOGY

## 2024-06-11 PROCEDURE — 99999 PR PBB SHADOW E&M-EST. PATIENT-LVL II: CPT | Mod: PBBFAC,,, | Performed by: OPHTHALMOLOGY

## 2024-06-11 PROCEDURE — 1159F MED LIST DOCD IN RCRD: CPT | Mod: CPTII,,, | Performed by: OPHTHALMOLOGY

## 2024-06-11 PROCEDURE — 92014 COMPRE OPH EXAM EST PT 1/>: CPT | Mod: S$PBB,,, | Performed by: OPHTHALMOLOGY

## 2024-06-11 NOTE — PROGRESS NOTES
HPI    DLS: 6/5/24    Pt here for 1 week abrasion f/u OS    Pt state blurry VA OU. OD starting to hurt.   Pain under OS.   Last edited by Alise Lee on 6/11/2024  1:53 PM.            Assessment /Plan     For exam results, see Encounter Report.    Abrasion of left cornea, initial encounter    Recurrent erosion of left cornea      Healed abrasion OS. But with hx of 2 recurrent erosions, so wished to have LASER PTK OS    Plan:  LASER PTK OS (next lasik day)

## 2024-07-03 PROBLEM — M32.9 LUPUS: Status: ACTIVE | Noted: 2024-07-03

## 2024-07-09 PROBLEM — R78.81 BACTEREMIA: Status: ACTIVE | Noted: 2024-07-09

## 2024-07-12 ENCOUNTER — TELEPHONE (OUTPATIENT)
Dept: OPHTHALMOLOGY | Facility: CLINIC | Age: 34
End: 2024-07-12
Payer: MEDICAID

## 2024-07-12 PROBLEM — B96.20 E COLI BACTEREMIA: Status: ACTIVE | Noted: 2024-07-09

## 2024-07-12 PROBLEM — N00.9 ACUTE NEPHRITIS: Status: ACTIVE | Noted: 2024-07-12

## 2024-07-12 NOTE — TELEPHONE ENCOUNTER
----- Message from Kathie Crowley sent at 7/11/2024  4:17 PM CDT -----  Regarding: Procedrue R/s  Patient called about needing to r/s  7/15 procedure due to being admitted.    Please call back to further assist- [phone]

## 2024-08-06 ENCOUNTER — TELEPHONE (OUTPATIENT)
Dept: OPHTHALMOLOGY | Facility: CLINIC | Age: 34
End: 2024-08-06
Payer: MEDICAID

## 2024-08-07 ENCOUNTER — TELEPHONE (OUTPATIENT)
Dept: OPHTHALMOLOGY | Facility: CLINIC | Age: 34
End: 2024-08-07
Payer: MEDICAID

## 2024-08-24 PROBLEM — R33.8 ACUTE URINARY RETENTION: Status: ACTIVE | Noted: 2024-08-24

## 2024-09-16 ENCOUNTER — OFFICE VISIT (OUTPATIENT)
Dept: OPHTHALMOLOGY | Facility: CLINIC | Age: 34
End: 2024-09-16
Payer: MEDICAID

## 2024-09-16 DIAGNOSIS — H18.832 RECURRENT EROSION OF LEFT CORNEA: Primary | ICD-10-CM

## 2024-09-16 PROCEDURE — 99999 PR PBB SHADOW E&M-EST. PATIENT-LVL III: CPT | Mod: PBBFAC,,, | Performed by: OPHTHALMOLOGY

## 2024-09-16 PROCEDURE — 3044F HG A1C LEVEL LT 7.0%: CPT | Mod: CPTII,,, | Performed by: OPHTHALMOLOGY

## 2024-09-16 PROCEDURE — 1160F RVW MEDS BY RX/DR IN RCRD: CPT | Mod: CPTII,,, | Performed by: OPHTHALMOLOGY

## 2024-09-16 PROCEDURE — 65450 TREATMENT OF CORNEAL LESION: CPT | Mod: PBBFAC,LT | Performed by: OPHTHALMOLOGY

## 2024-09-16 PROCEDURE — 1159F MED LIST DOCD IN RCRD: CPT | Mod: CPTII,,, | Performed by: OPHTHALMOLOGY

## 2024-09-16 PROCEDURE — 99213 OFFICE O/P EST LOW 20 MIN: CPT | Mod: PBBFAC | Performed by: OPHTHALMOLOGY

## 2024-09-16 PROCEDURE — 99024 POSTOP FOLLOW-UP VISIT: CPT | Mod: ,,, | Performed by: OPHTHALMOLOGY

## 2024-09-16 PROCEDURE — 65450 TREATMENT OF CORNEAL LESION: CPT | Mod: S$PBB,LT,, | Performed by: OPHTHALMOLOGY

## 2024-09-16 RX ORDER — MOXIFLOXACIN 5 MG/ML
1 SOLUTION/ DROPS OPHTHALMIC 4 TIMES DAILY
Qty: 3 ML | Refills: 3 | Status: SHIPPED | OUTPATIENT
Start: 2024-09-16

## 2024-09-16 RX ORDER — PREDNISOLONE ACETATE 10 MG/ML
1 SUSPENSION/ DROPS OPHTHALMIC 4 TIMES DAILY
Qty: 10 ML | Refills: 4 | Status: SHIPPED | OUTPATIENT
Start: 2024-09-16

## 2024-09-16 NOTE — PROGRESS NOTES
HPI    Pt is here for PTK OS  She has been using drops as directed    Moxi QID OS  PF QID OS  Last edited by Yoly Nye on 9/16/2024  1:58 PM.            Assessment /Plan     For exam results, see Encounter Report.    Recurrent erosion of left cornea    Other orders  -     moxifloxacin (VIGAMOX) 0.5 % ophthalmic solution; Place 1 drop into the left eye 4 (four) times daily.  Dispense: 3 mL; Refill: 3  -     prednisoLONE acetate (PRED FORTE) 1 % DrpS; Place 1 drop into the left eye 4 (four) times daily.  Dispense: 10 mL; Refill: 4      SURGEON:  Katlyn Colindres M.D.    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 After a thorough discussion of risks, benefits, and alternatives, it was agreed to proceed with surgical treatment to attempt visual rehabilitation and improve activities of daily living which have suffered due to the impaired visual status.    PROCEDURE IN DETAIL  The patient was placed in a supine position under the Excimer LASER unit, while the eye was prepped and draped in standard sterile fashion with 5% Betadine. A lid speculum was placed and the procedure begun by debridement of the corneal epithelium with an Am[place]ils brush.   A superficial keratectomy with a [place] blade was performed and the central edge of the fibrotic nodule was identified, then removed with 0.12 forceps.  The Excimer LASER PTK treatment with an optical zone of 6.5mm was then applied in a conservative fashion to Walters's layer.  Antibiotic drops were placed on the eye, and a bandage contact lens applied.  The patient will continue with  antibiotic and anti-inflammatory treatment, and be followed up in the eye clinic in 1 week.

## 2024-09-20 ENCOUNTER — OFFICE VISIT (OUTPATIENT)
Dept: OPHTHALMOLOGY | Facility: CLINIC | Age: 34
End: 2024-09-20
Payer: MEDICAID

## 2024-09-20 DIAGNOSIS — H18.832 RECURRENT EROSION OF LEFT CORNEA: Primary | ICD-10-CM

## 2024-09-20 PROCEDURE — 99213 OFFICE O/P EST LOW 20 MIN: CPT | Mod: PBBFAC | Performed by: OPHTHALMOLOGY

## 2024-09-20 PROCEDURE — 99999 PR PBB SHADOW E&M-EST. PATIENT-LVL III: CPT | Mod: PBBFAC,,, | Performed by: OPHTHALMOLOGY

## 2024-09-20 NOTE — PROGRESS NOTES
HPI    Patient is here today for 5 day post op PTK OS. Vision in OS looks blurry   and out of focus. Left eye feels scratchy and sensitive to light. Stabbing   pain in the back of the eye, rates it a 7/10.     Moxi QID OS  PF QID OS    Last edited by Fabiola Blankenship on 9/20/2024 10:04 AM.            Assessment /Plan     For exam results, see Encounter Report.    Recurrent erosion of left cornea      POD5 PTK OS  Epi mostly healed  PF tid  for 1 month    ALso needs Plaquenil screening.                    [Follow-Up: _____] : a [unfilled] follow-up visit [FreeTextEntry1] : Follow up on results

## 2024-09-25 PROBLEM — R33.9 URINARY RETENTION: Status: ACTIVE | Noted: 2024-08-24

## 2024-10-24 ENCOUNTER — PATIENT MESSAGE (OUTPATIENT)
Dept: GASTROENTEROLOGY | Facility: CLINIC | Age: 34
End: 2024-10-24
Payer: MEDICAID

## 2024-11-12 ENCOUNTER — TELEPHONE (OUTPATIENT)
Dept: OPHTHALMOLOGY | Facility: CLINIC | Age: 34
End: 2024-11-12
Payer: MEDICAID

## 2024-11-12 NOTE — TELEPHONE ENCOUNTER
----- Message from Kathie sent at 11/12/2024  1:04 PM CST -----  Regarding: Appt R/S  Reschedule Existing Appointment     Current appt date:11/12     Type of appt :Ep     Physician:Katlyn Colindres MD     Reason for rescheduling:Patient called to r/s today appt.      Caller:Marcy Romo     Contact Preference: [phone]

## 2024-11-18 PROBLEM — G43.909 MIGRAINE WITHOUT STATUS MIGRAINOSUS, NOT INTRACTABLE: Status: ACTIVE | Noted: 2024-11-18

## 2024-11-20 PROBLEM — G40.901 STATUS EPILEPTICUS: Status: ACTIVE | Noted: 2024-11-20

## 2024-11-21 PROBLEM — F41.9 ANXIETY DISORDER: Status: RESOLVED | Noted: 2024-03-19 | Resolved: 2024-11-21

## 2024-11-21 PROBLEM — Z71.89 ACP (ADVANCE CARE PLANNING): Status: ACTIVE | Noted: 2024-11-21

## 2024-11-22 PROBLEM — F32.A ANXIETY AND DEPRESSION: Status: RESOLVED | Noted: 2024-03-19 | Resolved: 2024-11-21

## 2024-12-09 PROBLEM — R31.29 MICROSCOPIC HEMATURIA: Status: ACTIVE | Noted: 2024-12-09

## 2024-12-09 PROBLEM — N23 URINARY TRACT PAIN: Status: ACTIVE | Noted: 2024-12-09

## 2024-12-09 PROBLEM — R10.31 RIGHT LOWER QUADRANT ABDOMINAL PAIN: Status: ACTIVE | Noted: 2024-12-09
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
## 2024-12-15 PROBLEM — S50.02XA CONTUSION OF LEFT ELBOW: Status: ACTIVE | Noted: 2024-12-15

## 2024-12-16 NOTE — PROGRESS NOTES
[facility]  Epilepsy Clinic Initial Consult Note    [facility] - NEUROLOGY [address] [facility], [place]    Date: 12/17/24  Patient Name: Marcy Romo   MRN: [id number]   PCP: Kaylie Ahuja  Referring Provider: Self, Aaareferral    Assessment:     4 Dimensional Epilepsy Classification  {4Dclass type:92408} paroxysmal events  Semiology: *** -> ***, frequency ***  Epileptogenic zone: ***  Etiology: ***  Co-morbidities: ***     34 y.o. female who presents for evaluation of ***    Plan:      Problem List Items Addressed This Visit    None         I completed education on seizure first aid and safety. I recommended seizure precautions with regards to avoiding unsupervised water recreational activity or bathing in tubs, climbing or working at heights, operation of heavy or dangerous machinery, caution around fire and sources of high heat, as well as any other activity which could put a patient at danger in case of a seizure.  I also reviewed the [facility] law and recommended ***.    Doreen Harry MD  [facility]   Department of Neurology    Patient note was created using MModal Dictation.  Any errors in syntax or even information may not have been identified and edited on initial review prior to signing this note.  Subjective:   Patient seen in consultation at the request of Self, Aaareferral for the evaluation of ***. A copy of this note will be sent to the referring physician.          HPI:   Ms. Marcy Romo is a 34 y.o. female who presents with a chief complaint of ***.  The patient {IS/IS NOT:84750} accompanied at this visit.      Onset: ***  Description:   Frequency:       Admitted 11/2024 for status epilepticus at [email or web address] vEEG done x 2 days without any abnormalities.  There was concern for PNEE.  She was referred to epilepsy and psychiatry.    Longest period seizure free: ***  Last seizure: ***  Seizure Triggers/ Provoking Features: {Seizure  Triggers:62741}  Previous Seizure Medications: {AED List:83969}  Current Seizure Medications:     Handedness: {left/right:501350}  Seizure Onset Age: ***  Seizure/ Epilepsy Risk Factors: {Seizure Risk Factors:34741}  Birth/Developmental History: Birth ***, development ***  Seizure related injuries: ***  Psychiatric/Behavioral Comorbitidies: ***  Surgical Candidacy: ***    ROS/other concerns:    Evaluation:  ***      PAST MEDICAL HISTORY:  Past Medical History:   Diagnosis Date    ADHD (attention deficit hyperactivity disorder)     Allergy     Anxiety     Asthma     Depression     Hyperlipidemia     Systemic lupus erythematosus, organ or system involvement unspecified        PAST SURGICAL HISTORY:  Past Surgical History:   Procedure Laterality Date    APPENDECTOMY      HAND SURGERY Right     HYSTERECTOMY      KNEE ARTHROSCOPY W/ ACL RECONSTRUCTION         CURRENT MEDS:  Current Outpatient Medications   Medication Sig Dispense Refill    AJOVY AUTOINJECTOR 225 mg/1.5 mL autoinjector Inject 225 mg into the skin every 30 days.      cariprazine (VRAYLAR) 1.5 mg Cap Take 3 mg by mouth once daily.      clonazePAM (KLONOPIN) 1 MG tablet Take 1 mg by mouth nightly as needed (seizures).      dextroamphetamine-amphetamine 30 mg Tab Take 1 tablet by mouth 2 (two) times daily.      diclofenac sodium (SOLARAZE) 3 % gel Apply 1 application  topically 2 (two) times a day.      fluticasone propionate (FLONASE) 50 mcg/actuation nasal spray 1 spray by Each Nostril route daily as needed for Allergies.      hydroxychloroquine (PLAQUENIL) 200 mg tablet Take 1 tablet (200 mg total) by mouth 2 (two) times daily. 180 tablet 0    hydrOXYzine pamoate (VISTARIL) 25 MG Cap Take 25-50 mg by mouth every evening.      levETIRAcetam (KEPPRA) 750 MG Tab Take 2 tablets (1,500 mg total) by mouth 2 (two) times daily. 30 tablet 0    linaCLOtide (LINZESS) 72 mcg Cap capsule Take 1 capsule (72 mcg total) by mouth before breakfast. 90 capsule 0     "methocarbamoL (ROBAXIN) 500 MG Tab Take 1 tablet (500 mg total) by mouth 3 (three) times daily as needed. 30 tablet 0    midazolam 5 mg/spray (0.1 mL) Spry 1 Dose by Nasal route daily as needed (PRN Seizure activity).      moxifloxacin (VIGAMOX) 0.5 % ophthalmic solution Place 1 drop into the left eye 4 (four) times daily. 3 mL 3    naproxen (NAPROSYN) 500 MG tablet Take 1 tablet (500 mg total) by mouth 2 (two) times daily as needed. 60 tablet 0    norethindrone (MICRONOR) 0.35 mg tablet Take 0.35 mg by mouth once daily.      NURTEC 75 mg odt Take 75 mg by mouth every other day.      omeprazole (PRILOSEC) 20 MG capsule Take 1 capsule (20 mg total) by mouth once daily. 90 capsule 0    ondansetron (ZOFRAN-ODT) 4 MG TbDL Take 1 tablet (4 mg total) by mouth every 8 (eight) hours as needed (nausea). 10 tablet 0    OXcarbazepine (TRILEPTAL) 150 MG Tab Take 1 tablet (150 mg total) by mouth 2 (two) times daily. 60 tablet 11    prednisoLONE acetate (PRED FORTE) 1 % DrpS Place 1 drop into the left eye 4 (four) times daily. 10 mL 4    sucralfate (CARAFATE) 100 mg/mL suspension Take 10 mLs (1 g total) by mouth 4 (four) times daily before meals and nightly. 473 mL 0    sumatriptan (IMITREX) 100 MG tablet Take 100 mg by mouth daily as needed for Migraine.      tamsulosin (FLOMAX) 0.4 mg Cap Take 0.4 mg by mouth once daily.      VENTOLIN HFA 90 mcg/actuation inhaler Inhale 1-2 puffs into the lungs every 6 (six) hours as needed for Wheezing or Shortness of Breath.       No current facility-administered medications for this visit.       ALLERGIES:  Review of patient's allergies indicates:   Allergen Reactions    Cymbalta [duloxetine] Other (See Comments)     "Makes me crazy"    Topamax [topiramate] Other (See Comments)     "Makes me crazy"    Tramadol Nausea And Vomiting       FAMILY HISTORY:  Family History   Problem Relation Name Age of Onset    Arthritis Mother      Alcohol abuse Mother      Drug abuse Mother      COPD Mother   "    Mental illness Mother      Hypertension Mother      Alcohol abuse Father      Heart disease Father      Diabetes Father         SOCIAL HISTORY:  Social History     Tobacco Use    Smoking status: Never     Passive exposure: Past    Smokeless tobacco: Never   Substance Use Topics    Alcohol use: Not Currently    Drug use: Yes     Comment: THC    ***    Review of Systems:  12 system review of systems is negative except for the symptoms mentioned in HPI.        Objective:   There were no vitals filed for this visit.    General: NAD, well nourished   Eyes: no tearing, discharge, no erythema   ENT: moist mucous membranes of the oral cavity, nares patent    Neck: Supple, Full range of motion  Cardiovascular: Warm and well perfused, pulses equal and symmetrical  Lungs: Normal work of breathing, normal chest wall excursions  Skin: No rash, lesions, or breakdown on exposed skin  Psychiatry: Mood and affect are appropriate   Abdomen: soft, non tender, non distended  Extremeties: No cyanosis, clubbing or edema.    Neurological   MENTAL STATUS: Alert and oriented to person, place, and time. Attention and concentration within normal limits. Speech without dysarthria, able to name and repeat without difficulty. Recent and remote memory within normal limits   CRANIAL NERVES: Visual fields intact. PERRL. EOMI. Facial sensation intact. Face symmetrical. Hearing grossly intact. Full shoulder shrug bilaterally. Tongue protrudes midline   SENSORY: Sensation is intact to {sensation:36479} throughout.  Joint position perception intact. Negative Romberg.   MOTOR: Normal bulk and tone. No pronator drift.  5/5 deltoid, biceps, triceps, interosseous, hand  bilaterally. 5/5 iliopsoas, knee extension/flexion, foot dorsi/plantarflexion bilaterally.    REFLEXES: Symmetric and 2+ throughout. Toes down going bilaterally.   CEREBELLAR/COORDINATION/GAIT: Gait steady with normal arm swing and stride length.  Heel to shin intact. Finger to nose  intact. Normal rapid alternating movements. ***

## 2024-12-17 ENCOUNTER — HOSPITAL ENCOUNTER (EMERGENCY)
Facility: HOSPITAL | Age: 34
Discharge: HOME | End: 2024-12-17
Attending: EMERGENCY MEDICINE
Payer: MEDICAID

## 2024-12-17 ENCOUNTER — OFFICE VISIT (OUTPATIENT)
Dept: NEUROLOGY | Facility: CLINIC | Age: 34
End: 2024-12-17
Payer: MEDICAID

## 2024-12-17 VITALS
HEIGHT: 65 IN | SYSTOLIC BLOOD PRESSURE: 113 MMHG | HEART RATE: 88 BPM | OXYGEN SATURATION: 97 % | RESPIRATION RATE: 17 BRPM | TEMPERATURE: 98 F | WEIGHT: 184 LBS | DIASTOLIC BLOOD PRESSURE: 55 MMHG | BODY MASS INDEX: 30.66 KG/M2

## 2024-12-17 DIAGNOSIS — S59.902D ELBOW INJURY, LEFT, SUBSEQUENT ENCOUNTER: ICD-10-CM

## 2024-12-17 DIAGNOSIS — M51.26 LUMBAR DISC HERNIATION: ICD-10-CM

## 2024-12-17 DIAGNOSIS — R56.9 SEIZURE-LIKE ACTIVITY: Primary | ICD-10-CM

## 2024-12-17 DIAGNOSIS — F44.5 PSYCHOGENIC NONEPILEPTIC SEIZURE: Primary | ICD-10-CM

## 2024-12-17 DIAGNOSIS — R33.9 URINARY RETENTION: ICD-10-CM

## 2024-12-17 LAB
ALBUMIN SERPL BCP-MCNC: 4.5 G/DL (ref 3.5–5.2)
ALP SERPL-CCNC: 76 U/L (ref 40–150)
ALT SERPL W/O P-5'-P-CCNC: 74 U/L (ref 10–44)
ANION GAP SERPL CALC-SCNC: 11 MMOL/L (ref 8–16)
AST SERPL-CCNC: 38 U/L (ref 10–40)
BASOPHILS # BLD AUTO: 0.04 K/UL (ref 0–0.2)
BASOPHILS NFR BLD: 0.6 % (ref 0–1.9)
BILIRUB SERPL-MCNC: 0.5 MG/DL (ref 0.1–1)
BUN SERPL-MCNC: 17 MG/DL (ref 6–20)
CALCIUM SERPL-MCNC: 9.7 MG/DL (ref 8.7–10.5)
CHLORIDE SERPL-SCNC: 106 MMOL/L (ref 95–110)
CO2 SERPL-SCNC: 20 MMOL/L (ref 23–29)
CREAT SERPL-MCNC: 0.9 MG/DL (ref 0.5–1.4)
CRP SERPL-MCNC: 7.5 MG/L (ref 0–8.2)
DIFFERENTIAL METHOD BLD: ABNORMAL
EOSINOPHIL # BLD AUTO: 0.1 K/UL (ref 0–0.5)
EOSINOPHIL NFR BLD: 0.8 % (ref 0–8)
ERYTHROCYTE [DISTWIDTH] IN BLOOD BY AUTOMATED COUNT: 12.6 % (ref 11.5–14.5)
ERYTHROCYTE [SEDIMENTATION RATE] IN BLOOD BY PHOTOMETRIC METHOD: <2 MM/HR (ref 0–36)
EST. GFR  (NO RACE VARIABLE): >60 ML/MIN/1.73 M^2
GLUCOSE SERPL-MCNC: 93 MG/DL (ref 70–110)
HCT VFR BLD AUTO: 48.4 % (ref 37–48.5)
HGB BLD-MCNC: 16.1 G/DL (ref 12–16)
IMM GRANULOCYTES # BLD AUTO: 0.02 K/UL (ref 0–0.04)
IMM GRANULOCYTES NFR BLD AUTO: 0.3 % (ref 0–0.5)
LYMPHOCYTES # BLD AUTO: 1.6 K/UL (ref 1–4.8)
LYMPHOCYTES NFR BLD: 22.2 % (ref 18–48)
MCH RBC QN AUTO: 30.7 PG (ref 27–31)
MCHC RBC AUTO-ENTMCNC: 33.3 G/DL (ref 32–36)
MCV RBC AUTO: 92 FL (ref 82–98)
MONOCYTES # BLD AUTO: 0.4 K/UL (ref 0.3–1)
MONOCYTES NFR BLD: 5.4 % (ref 4–15)
NEUTROPHILS # BLD AUTO: 5.1 K/UL (ref 1.8–7.7)
NEUTROPHILS NFR BLD: 70.7 % (ref 38–73)
NRBC BLD-RTO: 0 /100 WBC
PLATELET # BLD AUTO: 354 K/UL (ref 150–450)
PMV BLD AUTO: 9.6 FL (ref 9.2–12.9)
POTASSIUM SERPL-SCNC: 3.9 MMOL/L (ref 3.5–5.1)
PROT SERPL-MCNC: 8.8 G/DL (ref 6–8.4)
RBC # BLD AUTO: 5.24 M/UL (ref 4–5.4)
SODIUM SERPL-SCNC: 137 MMOL/L (ref 136–145)
WBC # BLD AUTO: 7.21 K/UL (ref 3.9–12.7)

## 2024-12-17 PROCEDURE — 99999 PR PBB SHADOW E&M-EST. PATIENT-LVL II: CPT | Mod: PBBFAC,,, | Performed by: STUDENT IN AN ORGANIZED HEALTH CARE EDUCATION/TRAINING PROGRAM

## 2024-12-17 PROCEDURE — 63600175 PHARM REV CODE 636 W HCPCS: Performed by: EMERGENCY MEDICINE

## 2024-12-17 PROCEDURE — 86140 C-REACTIVE PROTEIN: CPT | Performed by: EMERGENCY MEDICINE

## 2024-12-17 PROCEDURE — 99212 OFFICE O/P EST SF 10 MIN: CPT | Mod: PBBFAC,25 | Performed by: STUDENT IN AN ORGANIZED HEALTH CARE EDUCATION/TRAINING PROGRAM

## 2024-12-17 PROCEDURE — 99215 OFFICE O/P EST HI 40 MIN: CPT | Mod: S$PBB,,, | Performed by: STUDENT IN AN ORGANIZED HEALTH CARE EDUCATION/TRAINING PROGRAM

## 2024-12-17 PROCEDURE — 51702 INSERT TEMP BLADDER CATH: CPT

## 2024-12-17 PROCEDURE — 99284 EMERGENCY DEPT VISIT MOD MDM: CPT | Mod: 25,27

## 2024-12-17 PROCEDURE — 96375 TX/PRO/DX INJ NEW DRUG ADDON: CPT

## 2024-12-17 PROCEDURE — 1111F DSCHRG MED/CURRENT MED MERGE: CPT | Mod: CPTII,,, | Performed by: STUDENT IN AN ORGANIZED HEALTH CARE EDUCATION/TRAINING PROGRAM

## 2024-12-17 PROCEDURE — 96361 HYDRATE IV INFUSION ADD-ON: CPT

## 2024-12-17 PROCEDURE — 3044F HG A1C LEVEL LT 7.0%: CPT | Mod: CPTII,,, | Performed by: STUDENT IN AN ORGANIZED HEALTH CARE EDUCATION/TRAINING PROGRAM

## 2024-12-17 PROCEDURE — 96374 THER/PROPH/DIAG INJ IV PUSH: CPT

## 2024-12-17 PROCEDURE — 85652 RBC SED RATE AUTOMATED: CPT | Performed by: EMERGENCY MEDICINE

## 2024-12-17 PROCEDURE — 25000003 PHARM REV CODE 250: Performed by: EMERGENCY MEDICINE

## 2024-12-17 PROCEDURE — 85025 COMPLETE CBC W/AUTO DIFF WBC: CPT | Performed by: EMERGENCY MEDICINE

## 2024-12-17 PROCEDURE — 80053 COMPREHEN METABOLIC PANEL: CPT | Performed by: EMERGENCY MEDICINE

## 2024-12-17 RX ORDER — LORAZEPAM 2 MG/ML
2 INJECTION INTRAMUSCULAR
Status: COMPLETED | OUTPATIENT
Start: 2024-12-17 | End: 2024-12-17

## 2024-12-17 RX ORDER — HALOPERIDOL 5 MG/ML
5 INJECTION INTRAMUSCULAR
Status: COMPLETED | OUTPATIENT
Start: 2024-12-17 | End: 2024-12-17

## 2024-12-17 RX ORDER — DIPHENHYDRAMINE HYDROCHLORIDE 50 MG/ML
25 INJECTION INTRAMUSCULAR; INTRAVENOUS
Status: COMPLETED | OUTPATIENT
Start: 2024-12-17 | End: 2024-12-17

## 2024-12-17 RX ORDER — METOCLOPRAMIDE HYDROCHLORIDE 5 MG/ML
10 INJECTION INTRAMUSCULAR; INTRAVENOUS
Status: COMPLETED | OUTPATIENT
Start: 2024-12-17 | End: 2024-12-17

## 2024-12-17 RX ADMIN — METOCLOPRAMIDE 10 MG: 5 INJECTION, SOLUTION INTRAMUSCULAR; INTRAVENOUS at 03:12

## 2024-12-17 RX ADMIN — HALOPERIDOL LACTATE 5 MG: 5 INJECTION, SOLUTION INTRAMUSCULAR at 06:12

## 2024-12-17 RX ADMIN — LORAZEPAM 2 MG: 2 INJECTION INTRAMUSCULAR; INTRAVENOUS at 03:12

## 2024-12-17 RX ADMIN — SODIUM CHLORIDE 500 ML: 9 INJECTION, SOLUTION INTRAVENOUS at 03:12

## 2024-12-17 RX ADMIN — DIPHENHYDRAMINE HYDROCHLORIDE 25 MG: 50 INJECTION, SOLUTION INTRAMUSCULAR; INTRAVENOUS at 08:12

## 2024-12-17 NOTE — PROGRESS NOTES
Patient reports urinary incontinence for several weeks as well as retention, needs to use catheter.  Over the last few days has had bowel incontinence.  Reports genital/anal numbness.  Some left LE weakness and numbness traveling along the back of her leg down to her foot.  MRI in August showed moderate to severe L5 herniation.    Advised patient to go to ER for further evaluation given worsening neurological symptoms.

## 2024-12-17 NOTE — ED NOTES
Witness seizure activity in waiting room sitting in chair with father,  arm , legs and eyes twitching, total body lift to stretcher, placed on nrb mask, then to room 2

## 2024-12-17 NOTE — PROGRESS NOTES
[facility]  Epilepsy Clinic Initial Consult Note    [facility] - NEUROLOGY 7TH FL  [facility], [place]    Date: 12/17/24  Patient Name: Marcy Romo   MRN: [id number]   PCP: Kaylie Ahuja  Referring Provider: Self, Aaareferral    Assessment:       34 y.o. female who presents for evaluation of seizure like events.  Given sudden onset and frequent events I do have suspicion for PNEE.  Today she reported worsening neurological symptoms including bowel and bladder incontinence as well as saddle anesthesia.  She does have a L5 disc herniation which was shown on prior imaging in August.  While her examination has some limitations (give away/pain limited weakness), I believe she should present to the ER for repeat MRI lumbar spine stat and expedited evaluation given the new development of bowel incontinence.  We will have a follow up after ER visit and address seizure like activity at that time, with expected referral to the EMU.     Plan:      Problem List Items Addressed This Visit          Neuro    Seizure-like activity - Primary    Overview     Typical seminology involves hand shaking and confusion. Lasts around 3-7 minutes  - Neurology: Dr. Ledbetter in [facility]                   I completed education on seizure first aid and safety. I recommended seizure precautions with regards to avoiding unsupervised water recreational activity or bathing in tubs, climbing or working at heights, operation of heavy or dangerous machinery, caution around fire and sources of high heat, as well as any other activity which could put a patient at danger in case of a seizure.  I also reviewed the [place] DMV law and recommended no driving at this time.    Doreen Harry MD  [facility]   Department of Neurology    Patient note was created using MModal Dictation.  Any errors in syntax or even information may not have been identified and edited on initial review prior to signing this  note.  Subjective:        HPI:   Ms. Marcy Romo is a 34 y.o. female who presents with a chief complaint of seizure like activity.  The patient is not accompanied at this visit.      Onset: this year  Description: reports events typically start with a headache and then may progress to REY and generalized convulsions.  However, she also reports that others in her family have noticed staring spells prior to her first generalized convulsive event.    Frequency: variable, can cluster and have multiple in one day  Admitted 11/2024 for status epilepticus at [facility]; vEEG done x 2 days without any abnormalities.  There was concern for PNEE.  She was referred to epilepsy and psychiatry.    Longest period seizure free: a few months  Last seizure: november  Seizure Triggers/ Provoking Features: stress    Handedness: right  Seizure Onset Age: 34  Seizure/ Epilepsy Risk Factors: none  Birth/Developmental History: normal  Seizure related injuries: injured elbow (not fractured but in brace today)  Psychiatric/Behavioral Comorbitidies: depression/anxiety  Surgical Candidacy: n/a     ROS/other concerns:  Migraines  Lupus  Back issues/urinary  - reports this is new onset since August and over the past few days has had some incontinence of bowels.  Also reports numbness in genital area.          PAST MEDICAL HISTORY:  Past Medical History:   Diagnosis Date    ADHD (attention deficit hyperactivity disorder)     Allergy     Anxiety     Asthma     Depression     Hyperlipidemia     Systemic lupus erythematosus, organ or system involvement unspecified        PAST SURGICAL HISTORY:  Past Surgical History:   Procedure Laterality Date    APPENDECTOMY      HAND SURGERY Right     HYSTERECTOMY      KNEE ARTHROSCOPY W/ ACL RECONSTRUCTION         CURRENT MEDS:  Current Outpatient Medications   Medication Sig Dispense Refill    AJOVY AUTOINJECTOR 225 mg/1.5 mL autoinjector Inject 225 mg into the skin every 30 days.      cariprazine (VRAYLAR) 1.5 mg  Cap Take 3 mg by mouth once daily.      clonazePAM (KLONOPIN) 1 MG tablet Take 1 mg by mouth nightly as needed (seizures).      dextroamphetamine-amphetamine 30 mg Tab Take 1 tablet by mouth 2 (two) times daily.      diclofenac sodium (SOLARAZE) 3 % gel Apply 1 application  topically 2 (two) times a day.      fluticasone propionate (FLONASE) 50 mcg/actuation nasal spray 1 spray by Each Nostril route daily as needed for Allergies.      hydroxychloroquine (PLAQUENIL) 200 mg tablet Take 1 tablet (200 mg total) by mouth 2 (two) times daily. 180 tablet 0    hydrOXYzine pamoate (VISTARIL) 25 MG Cap Take 25-50 mg by mouth every evening.      levETIRAcetam (KEPPRA) 750 MG Tab Take 2 tablets (1,500 mg total) by mouth 2 (two) times daily. 30 tablet 0    linaCLOtide (LINZESS) 72 mcg Cap capsule Take 1 capsule (72 mcg total) by mouth before breakfast. 90 capsule 0    methocarbamoL (ROBAXIN) 500 MG Tab Take 1 tablet (500 mg total) by mouth 3 (three) times daily as needed. 30 tablet 0    midazolam 5 mg/spray (0.1 mL) Spry 1 Dose by Nasal route daily as needed (PRN Seizure activity).      moxifloxacin (VIGAMOX) 0.5 % ophthalmic solution Place 1 drop into the left eye 4 (four) times daily. 3 mL 3    naproxen (NAPROSYN) 500 MG tablet Take 1 tablet (500 mg total) by mouth 2 (two) times daily as needed. 60 tablet 0    norethindrone (MICRONOR) 0.35 mg tablet Take 0.35 mg by mouth once daily.      NURTEC 75 mg odt Take 75 mg by mouth every other day.      omeprazole (PRILOSEC) 20 MG capsule Take 1 capsule (20 mg total) by mouth once daily. 90 capsule 0    ondansetron (ZOFRAN-ODT) 4 MG TbDL Take 1 tablet (4 mg total) by mouth every 8 (eight) hours as needed (nausea). 10 tablet 0    OXcarbazepine (TRILEPTAL) 150 MG Tab Take 1 tablet (150 mg total) by mouth 2 (two) times daily. 60 tablet 11    prednisoLONE acetate (PRED FORTE) 1 % DrpS Place 1 drop into the left eye 4 (four) times daily. 10 mL 4    sucralfate (CARAFATE) 100 mg/mL  "suspension Take 10 mLs (1 g total) by mouth 4 (four) times daily before meals and nightly. 473 mL 0    sumatriptan (IMITREX) 100 MG tablet Take 100 mg by mouth daily as needed for Migraine.      tamsulosin (FLOMAX) 0.4 mg Cap Take 0.4 mg by mouth once daily.      VENTOLIN HFA 90 mcg/actuation inhaler Inhale 1-2 puffs into the lungs every 6 (six) hours as needed for Wheezing or Shortness of Breath.       No current facility-administered medications for this visit.       ALLERGIES:  Review of patient's allergies indicates:   Allergen Reactions    Cymbalta [duloxetine] Other (See Comments)     "Makes me crazy"    Topamax [topiramate] Other (See Comments)     "Makes me crazy"    Tramadol Nausea And Vomiting       FAMILY HISTORY:  Family History   Problem Relation Name Age of Onset    Arthritis Mother      Alcohol abuse Mother      Drug abuse Mother      COPD Mother      Mental illness Mother      Hypertension Mother      Alcohol abuse Father      Heart disease Father      Diabetes Father         SOCIAL HISTORY:  Social History     Tobacco Use    Smoking status: Never     Passive exposure: Past    Smokeless tobacco: Never   Substance Use Topics    Alcohol use: Not Currently    Drug use: Yes     Comment: THC        Review of Systems:  12 system review of systems is negative except for the symptoms mentioned in HPI.        Objective:   There were no vitals filed for this visit.    General: NAD, well nourished   Eyes: no tearing, discharge, no erythema   ENT: moist mucous membranes of the oral cavity, nares patent    Neck: Supple, Full range of motion  Cardiovascular: Warm and well perfused, pulses equal and symmetrical  Lungs: Normal work of breathing, normal chest wall excursions  Skin: No rash, lesions, or breakdown on exposed skin  Psychiatry: Mood and affect are appropriate   Abdomen: soft, non tender, non distended  Extremeties: No cyanosis, clubbing or edema.    Neurological   MENTAL STATUS: Alert and oriented to " person, place, and time. Attention and concentration within normal limits. Speech without dysarthria, able to name and repeat without difficulty. Recent and remote memory within normal limits   CRANIAL NERVES: Visual fields intact. PERRL. EOMI. Facial sensation intact. Face symmetrical. Hearing grossly intact. Full shoulder shrug bilaterally. Tongue protrudes midline   SENSORY: decreased sensation left leg along lateral aspect leading to foot.   MOTOR: 5/5 strength right side, 4/5 left hip flexor, 4-/5 left KE/KF however limited by knee pain (injured knee with last seizure attack)  REFLEXES: 3+ throughout, toes mute  CEREBELLAR/COORDINATION/GAIT: unsteady gait with occasional leaning to left side

## 2024-12-17 NOTE — ED PROVIDER NOTES
"Encounter Date: 12/17/2024       History     Chief Complaint   Patient presents with    Multiple complaints     Seizure on Sunday, injury to L shoulder seen at [facility], urinary  and bowel incontinence has worsened, sent here from neurology     34-year-old female with a history of anxiety and L-spine DJD presents with seizure-like activity.  She was seen in neurology clinic earlier today for reported breakthrough seizures.  She reported to them loss of bowel and bladder.  She has known DJD in her L-spine.  Sent down in the ED for evaluation of cauda equina.  While in the waiting room she had seizure-like activity.  Upon reaching in ED bed she was having a pseudo-seizure.  No grand mal seizure was present.  No postictal state was present.  She was given Ativan prior to me arriving in the room.  She has been seeing Urology recently for urinary retention.  She has been doing self catheterization.  She also sees a therapist for anxiety.  Today she has had associated nausea and diarrhea.  No vomiting, fever, shortness of breath, chest pain, or abdominal pain.  The patients available PMH, PSH, Social History, medications, allergies, and triage vital signs were reviewed just prior to their medical evaluation.         Review of patient's allergies indicates:   Allergen Reactions    Cymbalta [duloxetine] Other (See Comments)     "Makes me crazy"    Topamax [topiramate] Other (See Comments)     "Makes me crazy"    Tramadol Nausea And Vomiting     Past Medical History:   Diagnosis Date    ADHD (attention deficit hyperactivity disorder)     Allergy     Anxiety     Asthma     Depression     Hyperlipidemia     Systemic lupus erythematosus, organ or system involvement unspecified      Past Surgical History:   Procedure Laterality Date    APPENDECTOMY      HAND SURGERY Right     HYSTERECTOMY      KNEE ARTHROSCOPY W/ ACL RECONSTRUCTION       Family History   Problem Relation Name Age of Onset    Arthritis Mother      Alcohol abuse " Mother      Drug abuse Mother      COPD Mother      Mental illness Mother      Hypertension Mother      Alcohol abuse Father      Heart disease Father      Diabetes Father       Social History     Tobacco Use    Smoking status: Never     Passive exposure: Past    Smokeless tobacco: Never   Substance Use Topics    Alcohol use: Not Currently    Drug use: Yes     Comment: THC     Review of Systems   Constitutional:  Negative for fever.   Respiratory:  Positive for cough. Negative for shortness of breath.    Cardiovascular:  Negative for chest pain.   Gastrointestinal:  Positive for diarrhea and vomiting. Negative for abdominal pain and nausea.   Genitourinary:  Positive for difficulty urinating and dysuria.   Neurological:  Positive for headaches.       Physical Exam     Initial Vitals [12/17/24 1358]   BP Pulse Resp Temp SpO2   130/70 85 18 97.8 °F (36.6 °C) 100 %      MAP       --         Physical Exam    Nursing note and vitals reviewed.  Constitutional: She appears well-developed and well-nourished. She is not diaphoretic. No distress.   HENT:   Head: Normocephalic and atraumatic.   Nose: Nose normal.   Eyes: Conjunctivae are normal. Right eye exhibits no discharge. Left eye exhibits no discharge.   Neck: Neck supple.   Normal range of motion.  Cardiovascular:  Normal rate, regular rhythm and normal heart sounds.     Exam reveals no gallop and no friction rub.       No murmur heard.  Pulmonary/Chest: Breath sounds normal. No respiratory distress. She has no wheezes. She has no rhonchi. She has no rales.   Abdominal: Abdomen is soft. She exhibits no distension. There is no abdominal tenderness. There is no rebound and no guarding.   Musculoskeletal:         General: Tenderness present. No edema. Normal range of motion.      Cervical back: Normal range of motion and neck supple.      Comments: Left elbow tender to palpation, abrasion to right wrist     Neurological: She is alert and oriented to person, place, and  time. She has normal strength. GCS score is 15. GCS eye subscore is 4. GCS verbal subscore is 5. GCS motor subscore is 6.   Skin: Skin is warm and dry. No rash noted. No erythema.   Psychiatric: She has a normal mood and affect. Her behavior is normal. Judgment and thought content normal.         ED Course   Procedures  Labs Reviewed   CBC W/ AUTO DIFFERENTIAL - Abnormal       Result Value    WBC 7.21      RBC 5.24      Hemoglobin 16.1 (*)     Hematocrit 48.4      MCV 92      MCH 30.7      MCHC 33.3      RDW 12.6      Platelets 354      MPV 9.6      Immature Granulocytes 0.3      Gran # (ANC) 5.1      Immature Grans (Abs) 0.02      Lymph # 1.6      Mono # 0.4      Eos # 0.1      Baso # 0.04      nRBC 0      Gran % 70.7      Lymph % 22.2      Mono % 5.4      Eosinophil % 0.8      Basophil % 0.6      Differential Method Automated     COMPREHENSIVE METABOLIC PANEL - Abnormal    Sodium 137      Potassium 3.9      Chloride 106      CO2 20 (*)     Glucose 93      BUN 17      Creatinine 0.9      Calcium 9.7      Total Protein 8.8 (*)     Albumin 4.5      Total Bilirubin 0.5      Alkaline Phosphatase 76      AST 38      ALT 74 (*)     eGFR >60.0      Anion Gap 11     SEDIMENTATION RATE    Sed Rate <2     C-REACTIVE PROTEIN    CRP 7.5            Imaging Results              MRI Lumbar Spine Without Contrast (Final result)  Result time 12/17/24 20:18:23      Final result by Dhruv Guo MD (12/17/24 20:18:23)                   Impression:      Image quality is degraded by motion artifact.  Suggest repeat examination, when clinically appropriate.    Lumbar spondylosis, similar to prior MRI, most significant at L5-S1 with a asymmetric left disc bulge and central protrusion.  Mild spinal canal stenosis and moderate left neural foraminal narrowing.    Electronically signed by resident: Emerson Rico  Date:    12/17/2024  Time:    19:59    Electronically signed by: Dhruv Guo MD  Date:    12/17/2024  Time:    20:18                Narrative:    EXAMINATION:  MRI LUMBAR SPINE WITHOUT CONTRAST    CLINICAL HISTORY:  Low back pain, cauda equina syndrome suspected;    TECHNIQUE:  Multiplanar, multisequence MR images were acquired from the thoracolumbar junction to the sacrum without the administration of contrast.    There are some motion limitations to the exam    COMPARISON:  MRI lumbar spine 08/24/2024    FINDINGS:  Image quality is degraded by motion artifact.    Alignment: Normal sagittal alignment. No spondylolisthesis.    Vertebrae: Vertebral body heights are well maintained without evidence of fracture.  No marrow signal abnormality to suggest an infiltrative process.    Discs: Mild degenerative disc desiccation of L4-L5.  Moderate disc height loss and desiccation of L5-S1. Mild endplate edema of L5-S1.    Cord: Lower cord is normal morphology and signal. Conus terminates at L2.    Degenerative findings:    T12-L1:  No spinal canal stenosis. No neural foraminal narrowing.    L1-L2:  No spinal canal stenosis. No neural foraminal narrowing.    L2-L3:  No spinal canal stenosis. No neural foraminal narrowing.    L3-L4:  No spinal canal stenosis. No neural foraminal narrowing.    L4-L5:  No spinal canal stenosis. No neural foraminal narrowing.    L5-S1: Disc bulge asymmetric to the left with a superimposed central protrusion.  Mild spinal canal stenosis. Moderate left neural foraminal narrowing.    Limited views of the upper sacrum and upper sacroiliac joints are without significant abnormality.    Paraspinal muscles & soft tissues: No significant abnormality.                                       X-Ray Elbow Complete Left (Final result)  Result time 12/17/24 17:43:44      Final result by Dhruv Guo MD (12/17/24 17:43:44)                   Impression:      Acute nondisplaced intra-articular fracture of the head of the left radius.    Elbow effusion, in keeping with intra-articular extension of the fracture.      Electronically signed  by: Dhruv Guo MD  Date:    12/17/2024  Time:    17:43               Narrative:    EXAMINATION:  XR ELBOW COMPLETE 3 VIEW LEFT    CLINICAL HISTORY:  Unspecified injury of left elbow, subsequent encounter    TECHNIQUE:  AP, lateral, and oblique views of the left elbow were performed.    COMPARISON:  12/15/2024.    FINDINGS:  The bone mineralization is within normal limits.  There is an acute nondisplaced intra-articular fracture of the head of the left radius.  No additional fracture is identified.    The joint spaces are maintained.  There is unchanged appearance of left elbow joint effusion.  No radiopaque foreign body is identified.                                       Medications   sodium chloride 0.9% bolus 500 mL 500 mL (0 mLs Intravenous Stopped 12/17/24 1727)   metoclopramide injection 10 mg (10 mg Intravenous Given 12/17/24 1540)   LORazepam injection 2 mg (2 mg Intravenous Given 12/17/24 1522)   haloperidol lactate injection 5 mg (5 mg Intravenous Given 12/17/24 1829)   diphenhydrAMINE injection 25 mg (25 mg Intravenous Given 12/17/24 2026)     Medical Decision Making  34-year-old female presents with pseudo-seizure, loss of bowel and bladder, and left elbow pain.  Vitals with slight hypertension.  Physical exam as above.  Labs unremarkable.  Left elbow x-ray with radial head fracture.  Wrapped in Ace.  Placed in sling.  No splint indicated.  MRI shows disc herniation, but no evidence of cauda equina.  No acute surgical pathology.  No indication for emergent neurosurgical consult.  Will discharge home.  She will follow up with Neurosurgery and epilepsy in clinic.  Most importantly she will follow up with Urology for her urinary retention.  Courtney was placed in the ED, but she asked for to be removed.  She has supplies for self catheterization.  Patient will return to ED for worsening symptoms, inability to eat/drink, fever greater than 100.4, or any other concerns. Did bedside teaching with return  precautions.  All questions answered.  The patient acknowledges understanding.  Gave written and verbal discharge instructions.     Amount and/or Complexity of Data Reviewed  Independent Historian: caregiver  External Data Reviewed: notes.     Details: EEG, MRI  Labs: ordered. Decision-making details documented in ED Course.  Radiology: ordered. Decision-making details documented in ED Course.    Risk  Prescription drug management.  Diagnosis or treatment significantly limited by social determinants of health.                                      Clinical Impression:  Final diagnoses:  [S59.902D] Elbow injury, left, subsequent encounter  [F44.5] Psychogenic nonepileptic seizure (Primary)  [R33.9] Urinary retention  [M51.26] Lumbar disc herniation          ED Disposition Condition    Discharge Stable          ED Prescriptions    None       Follow-up Information       Follow up With Specialties Details Why Contact Info Additional Information    [facility] 8th Fl Neurosurgery   [address]  [facility] [id number] [phone] 8th Floor Clinic [place] Please park in [facility]. Check in desk is located in the [facility]. Please take the C elevator to 8th floor which opens to the lobby.    [facility] - Emergency Dept Emergency Medicine  Return to ED for worsening symptoms, inability to eat/drink, fever greater than 100.4, or any other concerns. [address]  [facility] [id number] [phone]              Pato Gresham MD  12/17/24 5009

## 2024-12-17 NOTE — FIRST PROVIDER EVALUATION
"Medical screening examination initiated.  I have conducted a focused provider triage encounter, findings are as follows:    Brief history of present illness:  3 days of worsening urinary incontinence, retention, fecal incontinence, saddle anesthesia, and left leg numbness. Seizure/fall on Sunday. Hx of same. Worsening symptoms since. MRI of L spine previously with protrusion. Also notes left arm pain radiating to shoulder/neck since seizure fall/sunday    Vitals:    12/17/24 1358   BP: 130/70   Pulse: 85   Resp: 18   Temp: 97.8 °F (36.6 °C)   TempSrc: Oral   SpO2: 100%   Weight: 83.5 kg (184 lb)   Height: 5' 5" (1.651 m)       Pertinent physical exam:  No acute distress. Left arm in sling. Walking without difficulty    Brief workup plan:  Labs, MRI l spine    Preliminary workup initiated; this workup will be continued and followed by the physician or advanced practice provider that is assigned to the patient when roomed.  "

## 2024-12-18 NOTE — ED NOTES
"Assumed care of the patient.  Pt in hospital gown, side rails up X2, bed low and locked. One family/visitors at bedside at this time. Pt denies any complaints or needs.     Marcy Romo, a 34 y.o. female presents to the ED w/ complaint of multiple complaints     Triage note:  Chief Complaint   Patient presents with    Multiple complaints     Seizure on Sunday, injury to L shoulder seen at [facility], urinary  and bowel incontinence has worsened, sent here from neurology     Review of patient's allergies indicates:   Allergen Reactions    Cymbalta [duloxetine] Other (See Comments)     "Makes me crazy"    Topamax [topiramate] Other (See Comments)     "Makes me crazy"    Tramadol Nausea And Vomiting     Past Medical History:   Diagnosis Date    ADHD (attention deficit hyperactivity disorder)     Allergy     Anxiety     Asthma     Depression     Hyperlipidemia     Systemic lupus erythematosus, organ or system involvement unspecified        "

## 2024-12-20 ENCOUNTER — OFFICE VISIT (OUTPATIENT)
Dept: OPTOMETRY | Facility: CLINIC | Age: 34
End: 2024-12-20
Payer: MEDICAID

## 2024-12-20 DIAGNOSIS — H16.223 KERATOCONJUNCTIVITIS SICCA, NOT SPECIFIED AS SJOGREN'S, BILATERAL: ICD-10-CM

## 2024-12-20 DIAGNOSIS — H52.7 REFRACTIVE ERROR: ICD-10-CM

## 2024-12-20 DIAGNOSIS — M32.9 SYSTEMIC LUPUS ERYTHEMATOSUS, UNSPECIFIED SLE TYPE, UNSPECIFIED ORGAN INVOLVEMENT STATUS: ICD-10-CM

## 2024-12-20 DIAGNOSIS — Z79.899 LONG-TERM USE OF PLAQUENIL: ICD-10-CM

## 2024-12-20 DIAGNOSIS — Z79.899 ENCOUNTER FOR EYE EXAM DUE TO HIGH RISK MEDICATION: Primary | ICD-10-CM

## 2024-12-20 PROCEDURE — 3044F HG A1C LEVEL LT 7.0%: CPT | Mod: CPTII,,, | Performed by: OPTOMETRIST

## 2024-12-20 PROCEDURE — 1159F MED LIST DOCD IN RCRD: CPT | Mod: CPTII,,, | Performed by: OPTOMETRIST

## 2024-12-20 PROCEDURE — 99204 OFFICE O/P NEW MOD 45 MIN: CPT | Mod: S$PBB,,, | Performed by: OPTOMETRIST

## 2024-12-20 PROCEDURE — 99999 PR PBB SHADOW E&M-EST. PATIENT-LVL I: CPT | Mod: PBBFAC,,, | Performed by: OPTOMETRIST

## 2024-12-20 PROCEDURE — 1111F DSCHRG MED/CURRENT MED MERGE: CPT | Mod: CPTII,,, | Performed by: OPTOMETRIST

## 2024-12-20 PROCEDURE — 99211 OFF/OP EST MAY X REQ PHY/QHP: CPT | Mod: PBBFAC | Performed by: OPTOMETRIST

## 2024-12-23 ENCOUNTER — TELEPHONE (OUTPATIENT)
Dept: OPTOMETRY | Facility: CLINIC | Age: 34
End: 2024-12-23
Payer: MEDICAID

## 2024-12-23 NOTE — PROGRESS NOTES
HPI    CC: Annual eye exam. Pt also on plaquenil 200mg BID. Patient was supposed   to see Dr. Colindres on Tuesday, but went to the ER. Suffers from seizures.    DARRICK: 9/20/2024 Dr. Colindres     (+) Changes in vision: States that she has noticed a decline in va OS   since sx  (-) Pain  (+) Irritation: Dryness  (+) Itching: OS  (-) Flashes  (-) Floaters  (-) Glasses wearer  (-) CL wearer  (+) Uses eye gtts: Systane BID OU     Does patient want a refraction today? If needed    (+) Eye injury: Corneal Abrasion due to metal in eye  (+) Eye surgery: LASER PTK OS with Dr. Colindres  (-)POHx  (-)FOHx    (-)DM         Last edited by Jaylyn Ann, OD on 12/23/2024  9:06 AM.            Assessment /Plan     For exam results, see Encounter Report.    Encounter for eye exam due to high risk medication    Long-term use of Plaquenil    Systemic lupus erythematosus, unspecified SLE type, unspecified organ involvement status    Keratoconjunctivitis sicca, not specified as Sjogren's, bilateral  -     lifitegrast (XIIDRA) 5 % Dpet; Place 1 drop into both eyes 2 (two) times daily.  Dispense: 60 each; Refill: 11    Refractive error      1-3. Educated pt on findings. Pt currently on plaquenil 200mg BID. No signs of maculopathy noted on slit lamp exam. Will order baseline testing, Mac OCT and 10-2 HVF. Pt to have testing in 1-2 months. Will call with results. If testing WNL, okay to monitor yearly with DFE and testing.     4. Educated pt on findings. Significant dry eyes OU. Rx Xiidra 1 gtt OU BID. Educated on Xiidra's delayed effectiveness. Recommend additional ATs prn. If symptoms worsen or dont improve, RTC. Monitor 1-2 months.      5. Will complete refraction at dry eye f/u. Monitor 1-2 months.       RTC in 1-2 months for dry eye f/u + refraction or sooner if needed.

## 2025-01-08 ENCOUNTER — TELEPHONE (OUTPATIENT)
Dept: NEUROLOGY | Facility: CLINIC | Age: 35
End: 2025-01-08
Payer: MEDICAID

## 2025-01-08 ENCOUNTER — PATIENT MESSAGE (OUTPATIENT)
Dept: ADMINISTRATIVE | Facility: OTHER | Age: 35
End: 2025-01-08
Payer: MEDICAID

## 2025-01-08 NOTE — TELEPHONE ENCOUNTER
Patient states that aimovig is causing her to break out in hives and she is getting blisters on scalp. Please advise.

## 2025-01-09 RX ORDER — METHYLPREDNISOLONE 4 MG/1
TABLET ORAL
Qty: 21 EACH | Refills: 0 | Status: SHIPPED | OUTPATIENT
Start: 2025-01-09 | End: 2025-01-30

## 2025-01-09 NOTE — TELEPHONE ENCOUNTER
Attempted to call patient back, no answer, left voicemail detailing that aimovig should be discontinued and prescribed medrol dose pack.  If patient should develop shortness of breath or facial swelling she was advised to go to ER for evaluation.    Aimovig added to allergies.

## 2025-01-22 ENCOUNTER — TELEPHONE (OUTPATIENT)
Dept: NEUROLOGY | Facility: CLINIC | Age: 35
End: 2025-01-22
Payer: MEDICAID

## 2025-01-22 NOTE — TELEPHONE ENCOUNTER
I left a message for the patient to let her know her appointment for tomorrow was canceled and rescheduled to Friday at 1:40 as a virtual appointment. The patient was asked to call back to reschedule if this is not a good day and time for her.

## 2025-01-24 ENCOUNTER — OFFICE VISIT (OUTPATIENT)
Dept: NEUROLOGY | Facility: CLINIC | Age: 35
End: 2025-01-24
Payer: MEDICAID

## 2025-01-24 DIAGNOSIS — R56.9 SEIZURE-LIKE ACTIVITY: Primary | ICD-10-CM

## 2025-01-24 NOTE — PROGRESS NOTES
[facility] Department of Neurology  Virtual Visit      [facility] - NEUROLOGY 7TH FL  [facility], [place]    Date: 1/24/25  Patient Name: Marcy Romo   MRN: [id number]   PCP: Chip Villatoro  Referring Provider: No ref. provider found    The patient location is: [place]  The chief complaint leading to consultation is: seizure like activity  Visit type: Virtual visit with synchronous audio and video  Total time spent with patient: 15 mins  Each patient to whom he or she provides medical services by telemedicine is:  (1) informed of the relationship between the physician and patient and the respective role of any other health care provider with respect to management of the patient; and (2) notified that he or she may decline to receive medical services by telemedicine and may withdraw from such care at any time.      Assessment:   Marcy Romo is a 34 y.o. female presenting for follow up for episodes of REY, occurring at variable frequencies with prior workup including EEG during hospitalization for status epilepticus showing no signs of epilepsy.  Clinical concern is for PNEE however this is diagnosis of exclusion and she will need EMU for definitive characterization of events.  Situation is complicated by fact that she is pending evaluation by neurosurgery for potential surgery.  Advised patient we will plan on EMU referral but she will need to see neurosurgery first to determine timeline of events.  RTC: after EMU.  Continue current ASMs, check levels and basic labs.     Plan:     Problem List Items Addressed This Visit          Neuro    Seizure-like activity - Primary    Overview     Typical seminology involves hand shaking and confusion. Lasts around 3-7 minutes  - Neurology: Dr. Ledbetter in metarie             Relevant Orders    Levetiracetam level    Oxcarbazepine level    Comprehensive metabolic panel (Completed)    CBC auto differential (Completed)    EMU Monitoring        Doreen Harry MD  [facility]   Department of Neurology    Patient note was created using MModal Dictation.  Any errors in syntax or even information may not have been identified and edited on initial review prior to signing this note.  Subjective:     Interval history 1/24/25:  At last visit I directed her to ER for expedited workup for lower extremity/genital numbness/incontinence of bladder and bowels.  She has an upcoming appointment with neurosurgery and may need surgery.     Currently on:  Keppra 4000 mg/day  Trileptal 600 mg/day  Has had 1 seizure like event since increase in anti-seizure medications.     Tried self injectable for migraines but developed hives   Qullipta     HPI: 12/17/24  Ms. Marcy Romo is a 34 y.o. female who presents with a chief complaint of seizure like activity.  The patient is not accompanied at this visit.       Onset: this year  Description: reports events typically start with a headache and then may progress to REY and generalized convulsions.  However, she also reports that others in her family have noticed staring spells prior to her first generalized convulsive event.    Frequency: variable, can cluster and have multiple in one day  Admitted 11/2024 for status epilepticus at [facility]; vEEG done x 2 days without any abnormalities.  There was concern for PNEE.  She was referred to epilepsy and psychiatry.    Longest period seizure free: a few months  Last seizure: november  Seizure Triggers/ Provoking Features: stress     Handedness: right  Seizure Onset Age: 34  Seizure/ Epilepsy Risk Factors: none  Birth/Developmental History: normal  Seizure related injuries: injured elbow (not fractured but in brace today)  Psychiatric/Behavioral Comorbitidies: depression/anxiety  Surgical Candidacy: n/a      ROS/other concerns:  Migraines  Lupus  Back issues/urinary  - reports this is new onset since August and over the past few days has had some incontinence of bowels.   Also reports numbness in genital area.         PAST MEDICAL HISTORY:  Past Medical History:   Diagnosis Date    ADHD (attention deficit hyperactivity disorder)     Allergy     Anxiety     Asthma     Depression     Hyperlipidemia     Systemic lupus erythematosus, organ or system involvement unspecified        PAST SURGICAL HISTORY:  Past Surgical History:   Procedure Laterality Date    APPENDECTOMY      HAND SURGERY Right     HYSTERECTOMY      KNEE ARTHROSCOPY W/ ACL RECONSTRUCTION         CURRENT MEDS:  Current Outpatient Medications   Medication Sig Dispense Refill    cariprazine (VRAYLAR) 1.5 mg Cap Take 3 mg by mouth once daily.      clonazePAM (KLONOPIN) 1 MG tablet Take 1 mg by mouth nightly as needed (seizures).      dextroamphetamine-amphetamine 30 mg Tab Take 1 tablet by mouth 2 (two) times daily.      diclofenac sodium (SOLARAZE) 3 % gel Apply 1 application  topically 2 (two) times a day.      FLUoxetine 20 MG capsule Take 20 mg by mouth once daily.      fluticasone propionate (FLONASE) 50 mcg/actuation nasal spray 1 spray by Each Nostril route daily as needed for Allergies.      hydroxychloroquine (PLAQUENIL) 200 mg tablet Take 1 tablet (200 mg total) by mouth 2 (two) times daily. 180 tablet 0    hydrOXYzine pamoate (VISTARIL) 25 MG Cap Take 25-50 mg by mouth every evening.      levETIRAcetam (KEPPRA) 750 MG Tab Take 2 tablets (1,500 mg total) by mouth 2 (two) times daily. 30 tablet 0    lifitegrast (XIIDRA) 5 % Dpet Place 1 drop into both eyes 2 (two) times daily. 60 each 11    linaCLOtide (LINZESS) 72 mcg Cap capsule Take 1 capsule (72 mcg total) by mouth before breakfast. 90 capsule 0    methylPREDNISolone (MEDROL DOSEPACK) 4 mg tablet use as directed 21 each 0    midazolam 5 mg/spray (0.1 mL) Spry 1 Dose by Nasal route daily as needed (PRN Seizure activity).      moxifloxacin (VIGAMOX) 0.5 % ophthalmic solution Place 1 drop into the left eye 4 (four) times daily. 3 mL 3    naproxen (NAPROSYN) 500 MG  "tablet Take 1 tablet (500 mg total) by mouth 2 (two) times daily as needed. 60 tablet 0    norethindrone (MICRONOR) 0.35 mg tablet Take 0.35 mg by mouth once daily.      NURTEC 75 mg odt Take 75 mg by mouth every other day.      omeprazole (PRILOSEC) 20 MG capsule Take 1 capsule (20 mg total) by mouth once daily. 90 capsule 0    ondansetron (ZOFRAN-ODT) 4 MG TbDL Take 1 tablet (4 mg total) by mouth every 8 (eight) hours as needed (nausea). 10 tablet 0    OXcarbazepine (TRILEPTAL) 150 MG Tab Take 1 tablet (150 mg total) by mouth 2 (two) times daily. 60 tablet 11    prazosin (MINIPRESS) 5 MG capsule Take 5 mg by mouth every evening.      prednisoLONE acetate (PRED FORTE) 1 % DrpS Place 1 drop into the left eye 4 (four) times daily. 10 mL 4    sucralfate (CARAFATE) 100 mg/mL suspension Take 10 mLs (1 g total) by mouth 4 (four) times daily before meals and nightly. 473 mL 0    sumatriptan (IMITREX) 100 MG tablet Take 100 mg by mouth daily as needed for Migraine.      tamsulosin (FLOMAX) 0.4 mg Cap Take 0.4 mg by mouth once daily.      UBRELVY 100 mg tablet Take by mouth.      VENTOLIN HFA 90 mcg/actuation inhaler Inhale 1-2 puffs into the lungs every 6 (six) hours as needed for Wheezing or Shortness of Breath.      ZAVZPRET 10 mg/actuation Spry SMARTSI Spray(s) Both Nares Daily PRN       No current facility-administered medications for this visit.       ALLERGIES:  Review of patient's allergies indicates:   Allergen Reactions    Aimovig autoinjector [erenumab-aooe] Blisters     Hives/blisters on scalp       Cymbalta [duloxetine] Other (See Comments)     "Makes me crazy"    Topamax [topiramate] Other (See Comments)     "Makes me crazy"    Tramadol Nausea And Vomiting       FAMILY HISTORY:  Family History   Problem Relation Name Age of Onset    Arthritis Mother      Alcohol abuse Mother      Drug abuse Mother      COPD Mother      Mental illness Mother      Hypertension Mother      Alcohol abuse Father      Heart " disease Father      Diabetes Father         SOCIAL HISTORY:  Social History     Tobacco Use    Smoking status: Never     Passive exposure: Past    Smokeless tobacco: Never   Substance Use Topics    Alcohol use: Not Currently    Drug use: Yes     Comment: THC       Review of Systems:  12 system review of systems is negative except for the symptoms mentioned in HPI.      Objective:   There were no vitals filed for this visit.  General: NAD, well nourished   Eyes: no tearing, discharge, no erythema   ENT: moist mucous membranes of the oral cavity, nares patent    Neck: Supple, full range of motion  Cardiovascular: Appears well perfused  Lungs: Normal work of breathing, normal chest wall excursions  Skin: No rash, lesions, or breakdown on exposed skin  Psychiatry: Mood and affect are appropriate   Abdomen: nondistended, non tender  Extremeties: No cyanosis, clubbing or edema visible    Neurological   MENTAL STATUS: Alert and oriented to person, place, and time. Attention and concentration within normal limits. Speech without dysarthria, able to name and repeat without difficulty. Recent and remote memory within normal limits   CRANIAL NERVES: Visual fields intact.  EOMI. Facial sensation intact. Face symmetrical. Hearing grossly intact. Full shoulder shrug bilaterally. Tongue protrudes midline

## 2025-01-27 ENCOUNTER — LAB VISIT (OUTPATIENT)
Dept: LAB | Facility: HOSPITAL | Age: 35
End: 2025-01-27
Attending: EMERGENCY MEDICINE
Payer: MEDICAID

## 2025-01-27 DIAGNOSIS — R56.9 SEIZURE-LIKE ACTIVITY: ICD-10-CM

## 2025-01-27 LAB
ALBUMIN SERPL BCP-MCNC: 4.2 G/DL (ref 3.5–5.2)
ALP SERPL-CCNC: 59 U/L (ref 40–150)
ALT SERPL W/O P-5'-P-CCNC: 167 U/L (ref 10–44)
ANION GAP SERPL CALC-SCNC: 7 MMOL/L (ref 8–16)
AST SERPL-CCNC: 42 U/L (ref 10–40)
BASOPHILS # BLD AUTO: 0.05 K/UL (ref 0–0.2)
BASOPHILS NFR BLD: 0.6 % (ref 0–1.9)
BILIRUB SERPL-MCNC: 0.4 MG/DL (ref 0.1–1)
BUN SERPL-MCNC: 8 MG/DL (ref 6–20)
CALCIUM SERPL-MCNC: 8.9 MG/DL (ref 8.7–10.5)
CHLORIDE SERPL-SCNC: 106 MMOL/L (ref 95–110)
CO2 SERPL-SCNC: 23 MMOL/L (ref 23–29)
CREAT SERPL-MCNC: 0.8 MG/DL (ref 0.5–1.4)
DIFFERENTIAL METHOD BLD: NORMAL
EOSINOPHIL # BLD AUTO: 0.1 K/UL (ref 0–0.5)
EOSINOPHIL NFR BLD: 1.2 % (ref 0–8)
ERYTHROCYTE [DISTWIDTH] IN BLOOD BY AUTOMATED COUNT: 11.8 % (ref 11.5–14.5)
EST. GFR  (NO RACE VARIABLE): >60 ML/MIN/1.73 M^2
GLUCOSE SERPL-MCNC: 121 MG/DL (ref 70–110)
HCT VFR BLD AUTO: 45.8 % (ref 37–48.5)
HGB BLD-MCNC: 15.7 G/DL (ref 12–16)
IMM GRANULOCYTES # BLD AUTO: 0.03 K/UL (ref 0–0.04)
IMM GRANULOCYTES NFR BLD AUTO: 0.3 % (ref 0–0.5)
LYMPHOCYTES # BLD AUTO: 2.4 K/UL (ref 1–4.8)
LYMPHOCYTES NFR BLD: 26.1 % (ref 18–48)
MCH RBC QN AUTO: 30.5 PG (ref 27–31)
MCHC RBC AUTO-ENTMCNC: 34.3 G/DL (ref 32–36)
MCV RBC AUTO: 89 FL (ref 82–98)
MONOCYTES # BLD AUTO: 0.4 K/UL (ref 0.3–1)
MONOCYTES NFR BLD: 4.6 % (ref 4–15)
NEUTROPHILS # BLD AUTO: 6.1 K/UL (ref 1.8–7.7)
NEUTROPHILS NFR BLD: 67.2 % (ref 38–73)
NRBC BLD-RTO: 0 /100 WBC
PLATELET # BLD AUTO: 294 K/UL (ref 150–450)
PMV BLD AUTO: 10.1 FL (ref 9.2–12.9)
POTASSIUM SERPL-SCNC: 4 MMOL/L (ref 3.5–5.1)
PROT SERPL-MCNC: 7.5 G/DL (ref 6–8.4)
RBC # BLD AUTO: 5.15 M/UL (ref 4–5.4)
SODIUM SERPL-SCNC: 136 MMOL/L (ref 136–145)
WBC # BLD AUTO: 9.07 K/UL (ref 3.9–12.7)

## 2025-01-27 PROCEDURE — 80183 DRUG SCRN QUANT OXCARBAZEPIN: CPT | Performed by: STUDENT IN AN ORGANIZED HEALTH CARE EDUCATION/TRAINING PROGRAM

## 2025-01-27 PROCEDURE — 80177 DRUG SCRN QUAN LEVETIRACETAM: CPT | Performed by: STUDENT IN AN ORGANIZED HEALTH CARE EDUCATION/TRAINING PROGRAM

## 2025-01-27 PROCEDURE — 85025 COMPLETE CBC W/AUTO DIFF WBC: CPT | Performed by: STUDENT IN AN ORGANIZED HEALTH CARE EDUCATION/TRAINING PROGRAM

## 2025-01-27 PROCEDURE — 36415 COLL VENOUS BLD VENIPUNCTURE: CPT | Mod: PN | Performed by: STUDENT IN AN ORGANIZED HEALTH CARE EDUCATION/TRAINING PROGRAM

## 2025-01-27 PROCEDURE — 80053 COMPREHEN METABOLIC PANEL: CPT | Performed by: STUDENT IN AN ORGANIZED HEALTH CARE EDUCATION/TRAINING PROGRAM

## 2025-01-28 ENCOUNTER — TELEPHONE (OUTPATIENT)
Dept: NEUROLOGY | Facility: CLINIC | Age: 35
End: 2025-01-28
Payer: MEDICAID

## 2025-01-28 ENCOUNTER — PATIENT MESSAGE (OUTPATIENT)
Dept: NEUROLOGY | Facility: HOSPITAL | Age: 35
End: 2025-01-28
Payer: MEDICAID

## 2025-01-28 DIAGNOSIS — R74.8 ELEVATED LIVER ENZYMES: Primary | ICD-10-CM

## 2025-01-29 LAB — OXCARBAZEPINE METABOLITE: 10 MCG/ML (ref 10–35)

## 2025-01-30 ENCOUNTER — OFFICE VISIT (OUTPATIENT)
Dept: NEUROSURGERY | Facility: CLINIC | Age: 35
End: 2025-01-30
Payer: MEDICAID

## 2025-01-30 VITALS
WEIGHT: 187.19 LBS | RESPIRATION RATE: 18 BRPM | DIASTOLIC BLOOD PRESSURE: 70 MMHG | HEART RATE: 85 BPM | HEIGHT: 65 IN | SYSTOLIC BLOOD PRESSURE: 126 MMHG | BODY MASS INDEX: 31.19 KG/M2

## 2025-01-30 DIAGNOSIS — M48.02 CERVICAL SPINAL STENOSIS: ICD-10-CM

## 2025-01-30 DIAGNOSIS — G95.9 MYELOPATHY: ICD-10-CM

## 2025-01-30 DIAGNOSIS — Z92.89 HISTORY OF MRI OF THORACIC SPINE: Primary | ICD-10-CM

## 2025-01-30 DIAGNOSIS — R33.9 URINARY RETENTION: ICD-10-CM

## 2025-01-30 LAB — LEVETIRACETAM SERPL-MCNC: 54.2 UG/ML (ref 3–60)

## 2025-01-30 NOTE — PROGRESS NOTES
Neurosurgery History & Physical    Patient ID: Marcy Romo is a 34 y.o. female.    Chief Complaint   Patient presents with    [facility] 1/30/24:    Very pleasant 34-year-old female with a ongoing chronic complex history, presents today with her mom.     34 year old female with seizures who presents today for low back, left leg symptoms and urinary retention.  She has a very complex history.  She states this started she believes in potentially August it would point she had a lumbar MRI without compression of the cauda equina.  She says she had an inpatient stay and November November which he is working up for some kidney issues and had an inpatient hospitalization.  At that point she developed her bowel and bladder symptoms she is having urinary incontinence at that point recommended self catheterization what she has been doing this ever since.  She will void spontaneously at times but stealth caps and will have retention.  She also notes constipation she will get numbness and sensory changes in the perianal region and episodes of bowel incontinence, again this has been ongoing through last year.  She has been to the emergency room at [facility] was referred to [facility] neurosurgery which she has not seen.  She sees Neurology for seizures which has been an chronic issue and plans further workup.  She has follow up with Neurology  She states that neurology at [facility] she has had follow up she states they recommended based on her cervical MRI a course of physical therapy follow up with [facility] neurosurgery.  She has been hospitalized for her seizures and recommend further workup.               Her urinary retention is significant with numbness in the saddle region. She has been in evaluated in the ED who recommended NSGY follow up. She also saw Dr. Leslie in Urology who performed a cysto 2 weeks ago without urological pathology her patient.            Of note she has outside MRI C spine at [facility] from 12/10, 2024  [[email or web address]] : left leg pain for aprox a month no specific mechanism of injury  performed for left sided neck pain, not available    Review of Systems   Constitutional:  Positive for activity change.   Respiratory:  Negative for cough.    Genitourinary:  Positive for difficulty urinating.   Musculoskeletal:  Positive for back pain and neck pain.   Neurological:  Positive for seizures and numbness.   Psychiatric/Behavioral:  Negative for confusion.          Past Medical History:   Diagnosis Date    ADHD (attention deficit hyperactivity disorder)     Allergy     Anxiety     Asthma     Depression     Hyperlipidemia     Systemic lupus erythematosus, organ or system involvement unspecified      Social History     Socioeconomic History    Marital status: Single   Tobacco Use    Smoking status: Never     Passive exposure: Past    Smokeless tobacco: Never   Substance and Sexual Activity    Alcohol use: Not Currently    Drug use: Yes     Comment: THC    Sexual activity: Yes     Partners: Female     Birth control/protection: See Surgical Hx     Social Drivers of Health     Financial Resource Strain: Patient Declined (11/22/2024)    Overall Financial Resource Strain (CARDIA)     Difficulty of Paying Living Expenses: Patient declined   Food Insecurity: Patient Declined (11/22/2024)    Hunger Vital Sign     Worried About Running Out of Food in the Last Year: Patient declined     Ran Out of Food in the Last Year: Patient declined   Transportation Needs: Patient Declined (11/22/2024)    TRANSPORTATION NEEDS     Transportation : Patient declined   Physical Activity: Unknown (8/5/2024)    Received from [facility]    Exercise Vital Sign     Days of Exercise per Week: Patient declined   Stress: Patient Declined (11/22/2024)    [employer] [place] [employer] - Occupational Stress Questionnaire     Feeling of Stress : Patient declined   Housing Stability: Patient Declined (11/22/2024)    Housing Stability Vital Sign     Unable to Pay for Housing in the Last Year: Patient declined     Homeless in the Last  "Year: Patient declined     Family History   Problem Relation Name Age of Onset    Arthritis Mother      Alcohol abuse Mother      Drug abuse Mother      COPD Mother      Mental illness Mother      Hypertension Mother      Alcohol abuse Father      Heart disease Father      Diabetes Father       Review of patient's allergies indicates:   Allergen Reactions    Aimovig autoinjector [erenumab-aooe] Blisters     Hives/blisters on scalp       Cymbalta [duloxetine] Other (See Comments)     "Makes me crazy"    Topamax [topiramate] Other (See Comments)     "Makes me crazy"    Tramadol Nausea And Vomiting       Current Outpatient Medications:     cariprazine (VRAYLAR) 1.5 mg Cap, Take 3 mg by mouth once daily., Disp: , Rfl:     clonazePAM (KLONOPIN) 1 MG tablet, Take 1 mg by mouth nightly as needed (seizures)., Disp: , Rfl:     dextroamphetamine-amphetamine 30 mg Tab, Take 1 tablet by mouth 2 (two) times daily., Disp: , Rfl:     diclofenac sodium (SOLARAZE) 3 % gel, Apply 1 application  topically 2 (two) times a day., Disp: , Rfl:     FLUoxetine 20 MG capsule, Take 20 mg by mouth once daily., Disp: , Rfl:     fluticasone propionate (FLONASE) 50 mcg/actuation nasal spray, 1 spray by Each Nostril route daily as needed for Allergies., Disp: , Rfl:     hydroxychloroquine (PLAQUENIL) 200 mg tablet, Take 1 tablet (200 mg total) by mouth 2 (two) times daily., Disp: 180 tablet, Rfl: 0    hydrOXYzine pamoate (VISTARIL) 25 MG Cap, Take 25-50 mg by mouth every evening., Disp: , Rfl:     levETIRAcetam (KEPPRA) 750 MG Tab, Take 2 tablets (1,500 mg total) by mouth 2 (two) times daily., Disp: 30 tablet, Rfl: 0    lifitegrast (XIIDRA) 5 % Dpet, Place 1 drop into both eyes 2 (two) times daily., Disp: 60 each, Rfl: 11    linaCLOtide (LINZESS) 72 mcg Cap capsule, Take 1 capsule (72 mcg total) by mouth before breakfast., Disp: 90 capsule, Rfl: 0    midazolam 5 mg/spray (0.1 mL) Spry, 1 Dose by Nasal route daily as needed (PRN Seizure activity)., " "Disp: , Rfl:     moxifloxacin (VIGAMOX) 0.5 % ophthalmic solution, Place 1 drop into the left eye 4 (four) times daily., Disp: 3 mL, Rfl: 3    naproxen (NAPROSYN) 500 MG tablet, Take 1 tablet (500 mg total) by mouth 2 (two) times daily as needed., Disp: 60 tablet, Rfl: 0    norethindrone (MICRONOR) 0.35 mg tablet, Take 0.35 mg by mouth once daily., Disp: , Rfl:     NURTEC 75 mg odt, Take 75 mg by mouth every other day., Disp: , Rfl:     omeprazole (PRILOSEC) 20 MG capsule, Take 1 capsule (20 mg total) by mouth once daily., Disp: 90 capsule, Rfl: 0    ondansetron (ZOFRAN-ODT) 4 MG TbDL, Take 1 tablet (4 mg total) by mouth every 8 (eight) hours as needed (nausea)., Disp: 10 tablet, Rfl: 0    OXcarbazepine (TRILEPTAL) 150 MG Tab, Take 1 tablet (150 mg total) by mouth 2 (two) times daily., Disp: 60 tablet, Rfl: 11    prazosin (MINIPRESS) 5 MG capsule, Take 5 mg by mouth every evening., Disp: , Rfl:     prednisoLONE acetate (PRED FORTE) 1 % DrpS, Place 1 drop into the left eye 4 (four) times daily., Disp: 10 mL, Rfl: 4    sucralfate (CARAFATE) 100 mg/mL suspension, Take 10 mLs (1 g total) by mouth 4 (four) times daily before meals and nightly., Disp: 473 mL, Rfl: 0    sumatriptan (IMITREX) 100 MG tablet, Take 100 mg by mouth daily as needed for Migraine., Disp: , Rfl:     tamsulosin (FLOMAX) 0.4 mg Cap, Take 0.4 mg by mouth once daily., Disp: , Rfl:     UBRELVY 100 mg tablet, Take by mouth., Disp: , Rfl:     VENTOLIN HFA 90 mcg/actuation inhaler, Inhale 1-2 puffs into the lungs every 6 (six) hours as needed for Wheezing or Shortness of Breath., Disp: , Rfl:     ZAVZPRET 10 mg/actuation Spry, SMARTSI Spray(s) Both Nares Daily PRN, Disp: , Rfl:     methylPREDNISolone (MEDROL DOSEPACK) 4 mg tablet, use as directed (Patient not taking: Reported on 2025), Disp: 21 each, Rfl: 0  Blood pressure 126/70, pulse 85, resp. rate 18, height 5' 5" (1.651 m), weight 84.9 kg (187 lb 2.7 oz).      Neurological Exam    General: " well developed, well nourished, no distress.   Head: normocephalic, atraumatic  Neurologic: Alert and oriented. Thought content appropriate.  Cranial nerves: face symmetric, EOMI.   Sensory: LLE numbness, nondermatomal     Motor Strength: Moves all extremities spontaneously with good tone. No abnormal movements seen.      Strength   Deltoids Triceps Biceps Wrist Extension Wrist Flexion Hand    Upper: R 5/5 5/5 5/5 5/5 5/5 5/5     L 5/5 5/5 5/5 5/5 5/5 4/5       Iliopsoas Quadriceps Knee  Flexion Tibialis  anterior Gastro- cnemius EHL   Lower: R 5/5 5/5 5/5 5/5 5/5 5/5     L 5/5 5/5 5/5 5/5 5/5 5/5      DTR's - 2+ and symmetric in UE   3+ patellar  Myles: +  Clonus: absent  Pulses: No lower extremity edema  Pulm: Normal respiratory effort  Abd: Soft, no distention or tenderness  Skin: Intact, no visible rashes or lesions      Imaging:  MRI L spine  Impression:     Image quality is degraded by motion artifact.  Suggest repeat examination, when clinically appropriate.     Lumbar spondylosis, similar to prior MRI, most significant at L5-S1 with a asymmetric left disc bulge and central protrusion.  Mild spinal canal stenosis and moderate left neural foraminal narrowing.     MRI C spine- outside 12/24 reported not available  Assessment/Plan:    34-year-old female with a very complex history.  She has had which she report some left arm pain, positive long track signs  Urinary incontinence, self cathing managed by Urology, BM inconsistency  Sensory changes without a clear sensory level  This has been ongoing sounds like since August but she feels definitely since her inpatient stay in November  She has seen the emergency department at [facility], neurology at [facility].  She is managed for seizures which have been complex and required hospitalization  She is set up from the ER with [facility] neuro surgery but was not evaluated presented to clinic  She has had 2 lumbar MRIs the 1st at onset which was in August and another in  December  She has disc degeneration at L5-S1 without significant or severe compression of the thecal sac not correlating with her syndrome  She states that she has had a cervical MRI that Neurology has seen this has not available for review  She also states that they ordered a thoracic MRI which I think is appropriate to complete her neural axis imaging and to rule out any pathological or structural findings.    Due to her complexity, established neurology care at [facility] [place] and complexity of her case  I have referred her to complex spine at [facility] for evaluation and management  She will obtain her cervical and thoracic MRI upload and brain for her visit  If she would require surgery this is where she would want the case under her care in collaboration with Neurosurgery her neurology team  Her and her mom are agreeable to this plan, he has a very appropriate questions or reasonable and hoping to find further explanation

## 2025-01-31 ENCOUNTER — TELEPHONE (OUTPATIENT)
Dept: NEUROLOGY | Facility: CLINIC | Age: 35
End: 2025-01-31
Payer: MEDICAID

## 2025-01-31 NOTE — TELEPHONE ENCOUNTER
Spoke with patient about EMU; she is aware an adult is required to accompany her for the duration including overnight. She will look at her calendar and call me back. Has my direct line as POC

## 2025-02-04 ENCOUNTER — OFFICE VISIT (OUTPATIENT)
Dept: NEUROSURGERY | Facility: CLINIC | Age: 35
End: 2025-02-04
Payer: MEDICAID

## 2025-02-04 VITALS — HEART RATE: 133 BPM | SYSTOLIC BLOOD PRESSURE: 133 MMHG | DIASTOLIC BLOOD PRESSURE: 84 MMHG

## 2025-02-04 DIAGNOSIS — R33.9 URINARY RETENTION: ICD-10-CM

## 2025-02-04 DIAGNOSIS — M54.16 LUMBAR RADICULOPATHY, CHRONIC: Primary | ICD-10-CM

## 2025-02-04 PROCEDURE — 99999 PR PBB SHADOW E&M-EST. PATIENT-LVL III: CPT | Mod: PBBFAC,,, | Performed by: STUDENT IN AN ORGANIZED HEALTH CARE EDUCATION/TRAINING PROGRAM

## 2025-02-04 PROCEDURE — 99213 OFFICE O/P EST LOW 20 MIN: CPT | Mod: PBBFAC | Performed by: STUDENT IN AN ORGANIZED HEALTH CARE EDUCATION/TRAINING PROGRAM

## 2025-02-04 PROCEDURE — 3079F DIAST BP 80-89 MM HG: CPT | Mod: CPTII,,, | Performed by: STUDENT IN AN ORGANIZED HEALTH CARE EDUCATION/TRAINING PROGRAM

## 2025-02-04 PROCEDURE — 3075F SYST BP GE 130 - 139MM HG: CPT | Mod: CPTII,,, | Performed by: STUDENT IN AN ORGANIZED HEALTH CARE EDUCATION/TRAINING PROGRAM

## 2025-02-04 PROCEDURE — 99215 OFFICE O/P EST HI 40 MIN: CPT | Mod: S$PBB,,, | Performed by: STUDENT IN AN ORGANIZED HEALTH CARE EDUCATION/TRAINING PROGRAM

## 2025-02-04 PROCEDURE — 1159F MED LIST DOCD IN RCRD: CPT | Mod: CPTII,,, | Performed by: STUDENT IN AN ORGANIZED HEALTH CARE EDUCATION/TRAINING PROGRAM

## 2025-02-04 NOTE — PROGRESS NOTES
Neurosurgery  Established Patient    SUBJECTIVE:     History of Present Illness:  34 year old female with seizures who presents today for low back, left leg symptoms and urinary retention.  She has a very complex history.  She states this started she believes in potentially August it would point she had a lumbar MRI without compression of the cauda equina.  She says she had an inpatient stay and November November which he is working up for some kidney issues and had an inpatient hospitalization.  At that point she developed her bowel and bladder symptoms she is having urinary incontinence at that point recommended self catheterization what she has been doing this ever since.  She will void spontaneously at times but stealth caps and will have retention.  She also notes constipation she will get numbness and sensory changes in the perianal region and episodes of bowel incontinence, again this has been ongoing through last year.  She has been to the emergency room at [facility] was [place] [facility] [place] which she has not seen.  She sees Neurology for seizures which has been an chronic issue and plans further workup.  She has follow up with Neurology  She states that [place] [facility] [place] she states they recommended based on her cervical MRI a course of physical therapy follow up with [facility] [place].  She has been hospitalized for her seizures and recommend further workup.    Interval fu 2/4/25:  Pt presents in fu for evaluation of her chronic incontinence and radiculopathy.  She has had intermittent incontinence and intermittent saddle anesthesia since August.  She states that the only time she doesn't have sensation in her saddle region is when she has severe pain.  She endorses right sided flank pain which has been progressing.  She also has numbness in her right buttock radiating down into her right posterior thigh.  She has chronic left sided calf numbness.  She has dropped items  "from her hands however, she is able to put on jewelry.  She is PT currently for her neck.    Review of patient's allergies indicates:   Allergen Reactions    Aimovig autoinjector [erenumab-aooe] Blisters     Hives/blisters on scalp       Cymbalta [duloxetine] Other (See Comments)     "Makes me crazy"    Topamax [topiramate] Other (See Comments)     "Makes me crazy"    Tramadol Nausea And Vomiting       Current Outpatient Medications   Medication Sig Dispense Refill    cariprazine (VRAYLAR) 1.5 mg Cap Take 3 mg by mouth once daily.      clonazePAM (KLONOPIN) 1 MG tablet Take 1 mg by mouth nightly as needed (seizures).      dextroamphetamine-amphetamine 30 mg Tab Take 1 tablet by mouth 2 (two) times daily.      diclofenac sodium (SOLARAZE) 3 % gel Apply 1 application  topically 2 (two) times a day.      FLUoxetine 20 MG capsule Take 20 mg by mouth once daily.      fluticasone propionate (FLONASE) 50 mcg/actuation nasal spray 1 spray by Each Nostril route daily as needed for Allergies.      hydroxychloroquine (PLAQUENIL) 200 mg tablet Take 1 tablet (200 mg total) by mouth 2 (two) times daily. 180 tablet 0    hydrOXYzine pamoate (VISTARIL) 25 MG Cap Take 25-50 mg by mouth every evening.      levETIRAcetam (KEPPRA) 750 MG Tab Take 2 tablets (1,500 mg total) by mouth 2 (two) times daily. 30 tablet 0    lifitegrast (XIIDRA) 5 % Dpet Place 1 drop into both eyes 2 (two) times daily. 60 each 11    linaCLOtide (LINZESS) 72 mcg Cap capsule Take 1 capsule (72 mcg total) by mouth before breakfast. 90 capsule 0    midazolam 5 mg/spray (0.1 mL) Spry 1 Dose by Nasal route daily as needed (PRN Seizure activity).      moxifloxacin (VIGAMOX) 0.5 % ophthalmic solution Place 1 drop into the left eye 4 (four) times daily. 3 mL 3    naproxen (NAPROSYN) 500 MG tablet Take 1 tablet (500 mg total) by mouth 2 (two) times daily as needed. 60 tablet 0    norethindrone (MICRONOR) 0.35 mg tablet Take 0.35 mg by mouth once daily.      NURTEC 75 mg " odt Take 75 mg by mouth every other day.      omeprazole (PRILOSEC) 20 MG capsule Take 1 capsule (20 mg total) by mouth once daily. 90 capsule 0    ondansetron (ZOFRAN-ODT) 4 MG TbDL Take 1 tablet (4 mg total) by mouth every 8 (eight) hours as needed (nausea). 10 tablet 0    OXcarbazepine (TRILEPTAL) 150 MG Tab Take 1 tablet (150 mg total) by mouth 2 (two) times daily. 60 tablet 11    prazosin (MINIPRESS) 5 MG capsule Take 5 mg by mouth every evening.      prednisoLONE acetate (PRED FORTE) 1 % DrpS Place 1 drop into the left eye 4 (four) times daily. 10 mL 4    sucralfate (CARAFATE) 100 mg/mL suspension Take 10 mLs (1 g total) by mouth 4 (four) times daily before meals and nightly. 473 mL 0    sumatriptan (IMITREX) 100 MG tablet Take 100 mg by mouth daily as needed for Migraine.      tamsulosin (FLOMAX) 0.4 mg Cap Take 0.4 mg by mouth once daily.      UBRELVY 100 mg tablet Take by mouth.      VENTOLIN HFA 90 mcg/actuation inhaler Inhale 1-2 puffs into the lungs every 6 (six) hours as needed for Wheezing or Shortness of Breath.      ZAVZPRET 10 mg/actuation Spry SMARTSI Spray(s) Both Nares Daily PRN       No current facility-administered medications for this visit.       Past Medical History:   Diagnosis Date    ADHD (attention deficit hyperactivity disorder)     Allergy     Anxiety     Asthma     Depression     Hyperlipidemia     Systemic lupus erythematosus, organ or system involvement unspecified      Past Surgical History:   Procedure Laterality Date    APPENDECTOMY      HAND SURGERY Right     HYSTERECTOMY      KNEE ARTHROSCOPY W/ ACL RECONSTRUCTION       Family History       Problem Relation (Age of Onset)    Alcohol abuse Mother, Father    Arthritis Mother    COPD Mother    Diabetes Father    Drug abuse Mother    Heart disease Father    Hypertension Mother    Mental illness Mother          Social History     Socioeconomic History    Marital status: Single   Tobacco Use    Smoking status: Never     Passive  exposure: Past    Smokeless tobacco: Never   Substance and Sexual Activity    Alcohol use: Not Currently    Drug use: Yes     Comment: THC    Sexual activity: Yes     Partners: Female     Birth control/protection: See Surgical Hx     Social Drivers of Health     Financial Resource Strain: Patient Declined (11/22/2024)    Overall Financial Resource Strain (CARDIA)     Difficulty of Paying Living Expenses: Patient declined   Food Insecurity: Patient Declined (11/22/2024)    Hunger Vital Sign     Worried About Running Out of Food in the Last Year: Patient declined     Ran Out of Food in the Last Year: Patient declined   Transportation Needs: Patient Declined (11/22/2024)    TRANSPORTATION NEEDS     Transportation : Patient declined   Physical Activity: Unknown (8/5/2024)    Received from [facility]    Exercise Vital Sign     Days of Exercise per Week: Patient declined   Stress: Patient Declined (11/22/2024)    [employer] - Occupational Stress Questionnaire     Feeling of Stress : Patient declined   Housing Stability: Patient Declined (11/22/2024)    Housing Stability Vital Sign     Unable to Pay for Housing in the Last Year: Patient declined     Homeless in the Last Year: Patient declined       Review of Systems  14 point ROS was negative    OBJECTIVE:     Vital Signs  Pulse: (!) 133  BP: 133/84  Pain Score:   9  There is no height or weight on file to calculate BMI.    Physical Exam:    Constitutional: She appears well-developed and well-nourished.     Eyes: Pupils are equal, round, and reactive to light.     Cardiovascular: Normal rate and regular rhythm.     Abdominal: Soft.     Psych/Behavior: She is alert. She is oriented to person, place, and time. She has a normal mood and affect.     Musculoskeletal: Gait is normal.        Neck: Range of motion is limited.        Back: Range of motion is limited.     Neurological:        Coordination: She has a normal Romberg Test and normal  tandem walking coordination.        DTRs: Bicep reflexes are 3+ on the right side and 3+ on the left side. Patellar reflexes are 3+ on the right side and 3+ on the left side.        Cranial nerves: Cranial nerve(s) II, III, IV, V, VI, VII, VIII, IX, X, XI and XII are intact.     5/5 in RUE/RLE; 4+/5 in LUE/LLE    Diminished sensation throughout most of left hemibody    +gunter's bilaterally    Severe TTP at right lower thoracic flank    Diagnostic Results:  MRI L spine: mild central canal stenosis with moderate left and mild right NF stenosis.  No high grade central canal stenosis throughout the visualized imaging.  MRI brain w and w/o: no acute intracranial abnormality.  Reviewed    ASSESSMENT/PLAN:     34 F with seizure disorder who presents for evaluation of incontinence and lumbar radiculopathy since August of this past year.  I discussed her lumbar and brain imaging which doesn't show an overt cause of her complaints.  She had a prior MRI c spine which is not available for review so I have requested that.  She should also get an MRI T spine to eval for cord compression in this region.  I would also like to get an EMG of her BLE d/t radiculopathy without a clear correlate on her lumbar imaging.  She may fu once these are completed.

## 2025-02-12 ENCOUNTER — TELEPHONE (OUTPATIENT)
Facility: CLINIC | Age: 35
End: 2025-02-12
Payer: MEDICAID

## 2025-02-12 ENCOUNTER — TELEPHONE (OUTPATIENT)
Dept: OPTOMETRY | Facility: CLINIC | Age: 35
End: 2025-02-12
Payer: MEDICAID

## 2025-02-12 NOTE — TELEPHONE ENCOUNTER
Pt scheduled and confirmed for Tuesday, Feb 25th for 10am. Appointment letter placed in the mail to confirmed address on file.

## 2025-02-12 NOTE — TELEPHONE ENCOUNTER
----- Message from Gladis sent at 2/12/2025  1:01 PM CST -----  Regarding: EMG Appt  Contact: [phone]  Marcy Romo calling regarding Patient Advice (message) for # pt is calling to schedule EMG pls advise

## 2025-02-12 NOTE — TELEPHONE ENCOUNTER
----- Message from Butch sent at 2/12/2025  1:04 PM CST -----  Regarding: Consult/Advisory  Contact: [phone]  Consult/Advisory     Name Of Caller: Marcyjake Romo        Contact Preference:  [phone]     Nature of call: Pt is calling because she was supposed to be scheduled baseline testing for Mac OCT and 10-2 HVF  before the next appt with Dr. Ann but states it never got scheduled.

## 2025-02-13 ENCOUNTER — TELEPHONE (OUTPATIENT)
Dept: NEUROLOGY | Facility: CLINIC | Age: 35
End: 2025-02-13
Payer: MEDICAID

## 2025-02-13 NOTE — TELEPHONE ENCOUNTER
Spoke with patient about scheduling her EMU admission. She has 3 kids and is waiting to hear back from family and when they can be available to assist her with the kids as well as come with her for the admission. She asked I send her information with my ph # in the portal.

## 2025-02-17 ENCOUNTER — TELEPHONE (OUTPATIENT)
Dept: NEUROLOGY | Facility: CLINIC | Age: 35
End: 2025-02-17
Payer: MEDICAID

## 2025-02-17 DIAGNOSIS — R56.9 SEIZURE-LIKE ACTIVITY: Primary | ICD-10-CM

## 2025-02-17 NOTE — TELEPHONE ENCOUNTER
Scheduled EMU 3/25/25, 11am. Aware an adult is required to accompany her for the duration including overnight. Aware she will be connected to lines to her head, chest, arm, have an IV placed; will not be able to leave the room until all equipment is removed at discharge. Instructions thoroughly discussed; mailed via [employer]

## 2025-02-19 ENCOUNTER — CLINICAL SUPPORT (OUTPATIENT)
Dept: OPHTHALMOLOGY | Facility: CLINIC | Age: 35
End: 2025-02-19
Payer: MEDICAID

## 2025-02-19 ENCOUNTER — TELEPHONE (OUTPATIENT)
Dept: NEUROSURGERY | Facility: CLINIC | Age: 35
End: 2025-02-19
Payer: MEDICAID

## 2025-02-19 DIAGNOSIS — Z79.899 ENCOUNTER FOR EYE EXAM DUE TO HIGH RISK MEDICATION: Primary | ICD-10-CM

## 2025-02-19 DIAGNOSIS — Z79.899 LONG-TERM USE OF PLAQUENIL: ICD-10-CM

## 2025-02-19 DIAGNOSIS — M32.9 SYSTEMIC LUPUS ERYTHEMATOSUS, UNSPECIFIED SLE TYPE, UNSPECIFIED ORGAN INVOLVEMENT STATUS: ICD-10-CM

## 2025-02-19 DIAGNOSIS — Z79.899 ENCOUNTER FOR EYE EXAM DUE TO HIGH RISK MEDICATION: ICD-10-CM

## 2025-02-19 NOTE — TELEPHONE ENCOUNTER
----- Message from Idalia sent at 2/19/2025 12:00 PM CST -----  Regarding: Pt called states wld like to speak with someone in regards to if Images has been received from [facility]  Contact: [phone]  Name of Who is Calling:HAYDEN SUERO [[id number]]  What is the request in detail:Pt called states wld like to speak with someone in regards to if Images has been received from [facility]. Please advise   Can the clinic reply by [facility]:No  What Number to Call Back if not in [facility]: Telephone Information:Mobile          [phone]

## 2025-02-20 ENCOUNTER — TELEPHONE (OUTPATIENT)
Dept: OPTOMETRY | Facility: CLINIC | Age: 35
End: 2025-02-20
Payer: MEDICAID

## 2025-02-20 ENCOUNTER — PATIENT MESSAGE (OUTPATIENT)
Dept: OPTOMETRY | Facility: CLINIC | Age: 35
End: 2025-02-20
Payer: MEDICAID

## 2025-02-21 ENCOUNTER — TELEPHONE (OUTPATIENT)
Dept: OPTOMETRY | Facility: CLINIC | Age: 35
End: 2025-02-21
Payer: MEDICAID

## 2025-02-21 NOTE — TELEPHONE ENCOUNTER
"----- Message from Tonio sent at 2/21/2025  8:39 AM CST -----  Reschedule Existing AppointmentAppt Date: 2/21Type of appt: dry eye f/uPhysician: NicksReason for rescheduling?  Provider cancelled; Requesting to r/s for soonest available Tuesday afternoonCaller: SelfContact Preference: [phone] Information: Returning call to Shea"Thank you for all that you do for our patients"  "

## 2025-02-21 NOTE — PROGRESS NOTES
Assessment /Plan     For exam results, see Encounter Report.    Encounter for eye exam due to high risk medication  -     Taylor Visual Field - OU - Extended - Both Eyes  -     OCT, Retina - OU - Both Eyes    Long-term use of Plaquenil  -     Taylor Visual Field - OU - Extended - Both Eyes  -     OCT, Retina - OU - Both Eyes    Systemic lupus erythematosus, unspecified SLE type, unspecified organ involvement status  -     Taylor Visual Field - OU - Extended - Both Eyes  -     OCT, Retina - OU - Both Eyes      Oct done ou     10-2  ss done ou     Rel & Fix =  good ou      Coop =     good     Patient has no allergies to latex or adhesives at this time    Jthomas

## 2025-02-25 ENCOUNTER — PROCEDURE VISIT (OUTPATIENT)
Facility: CLINIC | Age: 35
End: 2025-02-25
Payer: MEDICAID

## 2025-02-25 DIAGNOSIS — M54.16 LUMBAR RADICULOPATHY, CHRONIC: ICD-10-CM

## 2025-02-25 PROCEDURE — 95910 NRV CNDJ TEST 7-8 STUDIES: CPT | Mod: PBBFAC | Performed by: PSYCHIATRY & NEUROLOGY

## 2025-02-25 PROCEDURE — 95886 MUSC TEST DONE W/N TEST COMP: CPT | Mod: 26,S$PBB,, | Performed by: PSYCHIATRY & NEUROLOGY

## 2025-02-25 PROCEDURE — 95910 NRV CNDJ TEST 7-8 STUDIES: CPT | Mod: 26,S$PBB,, | Performed by: PSYCHIATRY & NEUROLOGY

## 2025-02-25 PROCEDURE — 95886 MUSC TEST DONE W/N TEST COMP: CPT | Mod: PBBFAC | Performed by: PSYCHIATRY & NEUROLOGY

## 2025-03-06 ENCOUNTER — TELEPHONE (OUTPATIENT)
Facility: CLINIC | Age: 35
End: 2025-03-06
Payer: MEDICAID

## 2025-03-06 NOTE — TELEPHONE ENCOUNTER
"----- Message from Ashley sent at 3/5/2025  4:47 PM CST -----  Regarding: pt advice  Contact: [phone]  .Name Of Caller: Self Contact Preference?:[phone] What is the nature of the call?: in reference to question pertaining to pt CT Scan, pls call  Additional Notes:"Thank you for all that you do for our patients"  " Hydroxychloroquine Pregnancy And Lactation Text: This medication has been shown to cause fetal harm but it isn't assigned a Pregnancy Risk Category. There are small amounts excreted in breast milk.

## 2025-03-11 ENCOUNTER — TELEPHONE (OUTPATIENT)
Dept: NEUROLOGY | Facility: CLINIC | Age: 35
End: 2025-03-11
Payer: MEDICAID

## 2025-03-20 ENCOUNTER — OFFICE VISIT (OUTPATIENT)
Dept: NEUROSURGERY | Facility: CLINIC | Age: 35
End: 2025-03-20
Payer: MEDICAID

## 2025-03-20 DIAGNOSIS — M54.16 LUMBAR RADICULOPATHY, CHRONIC: Primary | ICD-10-CM

## 2025-03-20 PROCEDURE — 1159F MED LIST DOCD IN RCRD: CPT | Mod: CPTII,,, | Performed by: STUDENT IN AN ORGANIZED HEALTH CARE EDUCATION/TRAINING PROGRAM

## 2025-03-20 PROCEDURE — 99214 OFFICE O/P EST MOD 30 MIN: CPT | Mod: S$PBB,,, | Performed by: STUDENT IN AN ORGANIZED HEALTH CARE EDUCATION/TRAINING PROGRAM

## 2025-03-20 NOTE — PROGRESS NOTES
Neurosurgery  Established Patient    SUBJECTIVE:     History of Present Illness:  34 year old female with seizures who presents today for low back, left leg symptoms and urinary retention.  She has a very complex history.  She states this started she believes in potentially August it would point she had a lumbar MRI without compression of the cauda equina.  She says she had an inpatient stay and November November which he is working up for some kidney issues and had an inpatient hospitalization.  At that point she developed her bowel and bladder symptoms she is having urinary incontinence at that point recommended self catheterization what she has been doing this ever since.  She will void spontaneously at times but stealth caps and will have retention.  She also notes constipation she will get numbness and sensory changes in the perianal region and episodes of bowel incontinence, again this has been ongoing through last year.  She has been to the emergency room at [facility] was referred to [facility] which she has not seen.  She sees Neurology for seizures which has been an chronic issue and plans further workup.  She has follow up with Neurology  She states that neurology at [facility] she has had follow up she states they recommended based on her cervical MRI a course of physical therapy follow up with [facility] neurosurgery.  She has been hospitalized for her seizures and recommend further workup.     Interval fu 2/4/25:  Pt presents in fu for evaluation of her chronic incontinence and radiculopathy.  She has had intermittent incontinence and intermittent saddle anesthesia since August.  She states that the only time she doesn't have sensation in her saddle region is when she has severe pain.  She endorses right sided flank pain which has been progressing.  She also has numbness in her right buttock radiating down into her right posterior thigh.  She has chronic left sided calf numbness.  She has dropped  "items from her hands however, she is able to put on jewelry.  She is PT currently for her neck.    Interval fu 3/20/25:  Pt presents in fu to review updated imaging.  She continues to have intermittent incontinence and is self cathing.  She is scheduled to see urology.  No additional changes in her symptoms.  She is scheduled to get L5/S1 BELGICA tomorrow.    Review of patient's allergies indicates:   Allergen Reactions    Aimovig autoinjector [erenumab-aooe] Blisters     Hives/blisters on scalp       Cymbalta [duloxetine] Other (See Comments)     "Makes me crazy"    Topamax [topiramate] Other (See Comments)     "Makes me crazy"    Tramadol Nausea And Vomiting       Current Medications[1]    Past Medical History:   Diagnosis Date    ADHD (attention deficit hyperactivity disorder)     Allergy     Anxiety     Asthma     Depression     Hyperlipidemia     Systemic lupus erythematosus, organ or system involvement unspecified      Past Surgical History:   Procedure Laterality Date    APPENDECTOMY      HAND SURGERY Right     HYSTERECTOMY      KNEE ARTHROSCOPY W/ ACL RECONSTRUCTION       Family History       Problem Relation (Age of Onset)    Alcohol abuse Mother, Father    Arthritis Mother    COPD Mother    Diabetes Father    Drug abuse Mother    Heart disease Father    Hypertension Mother    Mental illness Mother          Social History     Socioeconomic History    Marital status: Single   Tobacco Use    Smoking status: Never     Passive exposure: Past    Smokeless tobacco: Never   Substance and Sexual Activity    Alcohol use: Not Currently    Drug use: Yes     Comment: THC    Sexual activity: Yes     Partners: Female     Birth control/protection: See Surgical Hx     Social Drivers of Health     Financial Resource Strain: Patient Declined (11/22/2024)    Overall Financial Resource Strain (CARDIA)     Difficulty of Paying Living Expenses: Patient declined   Food Insecurity: Patient Declined (11/22/2024)    Hunger Vital Sign    "  Worried About Running Out of Food in the Last Year: Patient declined     Ran Out of Food in the Last Year: Patient declined   Transportation Needs: Patient Declined (11/22/2024)    TRANSPORTATION NEEDS     Transportation : Patient declined   Physical Activity: Unknown (8/5/2024)    Received from [facility]    Exercise Vital Sign     Days of Exercise per Week: Patient declined   Stress: Patient Declined (11/22/2024)    [employer] - Occupational Stress Questionnaire     Feeling of Stress : Patient declined   Housing Stability: Patient Declined (11/22/2024)    Housing Stability Vital Sign     Unable to Pay for Housing in the Last Year: Patient declined     Homeless in the Last Year: Patient declined       Review of Systems  14 point ROS was negative    OBJECTIVE:     Vital Signs  Pain Score:   8  There is no height or weight on file to calculate BMI.    Neurosurgery Physical Exam  Constitutional: She appears well-developed and well-nourished.      Eyes: Pupils are equal, round, and reactive to light.      Cardiovascular: Normal rate and regular rhythm.      Abdominal: Soft.      Psych/Behavior: She is alert. She is oriented to person, place, and time. She has a normal mood and affect.      Musculoskeletal: Gait is normal.        Neck: Range of motion is limited.        Back: Range of motion is limited.      Neurological:        Coordination: She has a normal Romberg Test and normal tandem walking coordination.        DTRs: Bicep reflexes are 3+ on the right side and 3+ on the left side. Patellar reflexes are 3+ on the right side and 3+ on the left side.        Cranial nerves: Cranial nerve(s) II, III, IV, V, VI, VII, VIII, IX, X, XI and XII are intact.      5/5 in RUE/RLE; 4+/5 in LUE/LLE     Diminished sensation throughout most of left hemibody     +gunter's bilaterally         Diagnostic Results:  MRI C/T: no high grade stenosis in the cervical or thoracic spine.  Small disc bulges at  C5/6, 6/7.  There is STIR signal in the ventral cord at C6/7 however this isn't present on the T2 sagittal or axial so I believe this likely artifactual.  EMG: acute or ongoing denervation/chronic denervation of left L5 nerve  Reviewed    MRI L spine: mild central canal stenosis with moderate left and mild right NF stenosis.  No high grade central canal stenosis throughout the visualized imaging.  MRI brain w and w/o: no acute intracranial abnormality.    ASSESSMENT/PLAN:     34 F with seizure disorder who presents for evaluation of incontinence and lumbar radiculopathy since August of this past year. I discussed her lumbar and brain imaging which doesn't show an overt cause of her complaints.  Today she brought her cervical and thoracic imaging which shows no high grade stenosis to account for her incontinence symptoms.  Her EMG report is described above and I feel that it is reasonable to pursue the L5/S1 BELGICA.  Pt may fu after the BELGICA to assess for any improvement in her symptoms.             [1]   Current Outpatient Medications   Medication Sig Dispense Refill    cariprazine (VRAYLAR) 1.5 mg Cap Take 3 mg by mouth once daily.      cenobamate (XCOPRI) 25 mg Tab Take 25 mg by mouth once daily.      clonazePAM (KLONOPIN) 1 MG tablet Take 1 mg by mouth nightly as needed (seizures).      dextroamphetamine-amphetamine 30 mg Tab Take 1 tablet by mouth 2 (two) times daily.      diclofenac sodium (SOLARAZE) 3 % gel Apply 1 application  topically 2 (two) times a day. 100 g 2    FLUoxetine 20 MG capsule Take 20 mg by mouth once daily.      fluticasone propionate (FLONASE) 50 mcg/actuation nasal spray 1 spray by Each Nostril route daily as needed for Allergies.      hydroxychloroquine (PLAQUENIL) 200 mg tablet Take 1 tablet (200 mg total) by mouth 2 (two) times daily. 180 tablet 0    hydrOXYzine pamoate (VISTARIL) 25 MG Cap Take 25-50 mg by mouth every evening.      levETIRAcetam (KEPPRA) 750 MG Tab Take 2 tablets (1,500 mg  total) by mouth 2 (two) times daily. 30 tablet 0    lifitegrast (XIIDRA) 5 % Dpet Place 1 drop into both eyes 2 (two) times daily. 60 each 11    linaCLOtide (LINZESS) 72 mcg Cap capsule Take 1 capsule (72 mcg total) by mouth before breakfast. 90 capsule 0    midazolam 5 mg/spray (0.1 mL) Spry 1 Dose by Nasal route daily as needed (PRN Seizure activity).      moxifloxacin (VIGAMOX) 0.5 % ophthalmic solution Place 1 drop into the left eye 4 (four) times daily. 3 mL 3    naproxen (NAPROSYN) 500 MG tablet Take 1 tablet (500 mg total) by mouth 2 (two) times daily as needed. 60 tablet 0    norethindrone (MICRONOR) 0.35 mg tablet Take 0.35 mg by mouth once daily.      NURTEC 75 mg odt Take 75 mg by mouth every other day.      ondansetron (ZOFRAN-ODT) 4 MG TbDL Take 1 tablet (4 mg total) by mouth every 8 (eight) hours as needed (nausea). 10 tablet 0    orphenadrine (NORFLEX) 100 mg tablet Take 100 mg by mouth 2 (two) times daily as needed.      OXcarbazepine (TRILEPTAL) 150 MG Tab Take 1 tablet (150 mg total) by mouth 2 (two) times daily. 60 tablet 11    prazosin (MINIPRESS) 5 MG capsule Take 5 mg by mouth every evening.      prednisoLONE acetate (PRED FORTE) 1 % DrpS Place 1 drop into the left eye 4 (four) times daily. 10 mL 4    rizatriptan (MAXALT) 10 MG tablet Take 10 mg by mouth daily as needed.      sucralfate (CARAFATE) 100 mg/mL suspension Take 10 mLs (1 g total) by mouth 4 (four) times daily before meals and nightly. 473 mL 1    tamsulosin (FLOMAX) 0.4 mg Cap Take 0.4 mg by mouth once daily.      UBRELVY 100 mg tablet Take by mouth.      VENTOLIN HFA 90 mcg/actuation inhaler Inhale 1-2 puffs into the lungs every 6 (six) hours as needed for Wheezing or Shortness of Breath.      omeprazole (PRILOSEC) 20 MG capsule Take 1 capsule (20 mg total) by mouth once daily. 90 capsule 0     No current facility-administered medications for this visit.

## 2025-03-25 ENCOUNTER — HOSPITAL ENCOUNTER (INPATIENT)
Facility: HOSPITAL | Age: 35
LOS: 2 days | Discharge: HOME | DRG: 880 | End: 2025-03-27
Attending: PSYCHIATRY & NEUROLOGY | Admitting: PSYCHIATRY & NEUROLOGY
Payer: MEDICAID

## 2025-03-25 DIAGNOSIS — R33.9 URINARY RETENTION: ICD-10-CM

## 2025-03-25 DIAGNOSIS — R56.9 SEIZURE-LIKE ACTIVITY: ICD-10-CM

## 2025-03-25 DIAGNOSIS — F44.9 CONVERSION DISORDER: Primary | ICD-10-CM

## 2025-03-25 LAB
ABSOLUTE EOSINOPHIL (OHS): 0.11 K/UL
ABSOLUTE MONOCYTE (OHS): 0.62 K/UL (ref 0.3–1)
ABSOLUTE NEUTROPHIL COUNT (OHS): 6.44 K/UL (ref 1.8–7.7)
ALBUMIN SERPL BCP-MCNC: 4 G/DL (ref 3.5–5.2)
ALP SERPL-CCNC: 59 UNIT/L (ref 40–150)
ALT SERPL W/O P-5'-P-CCNC: 151 UNIT/L (ref 10–44)
AMPHET UR QL SCN: ABNORMAL
ANION GAP (OHS): 7 MMOL/L (ref 8–16)
AST SERPL-CCNC: 32 UNIT/L (ref 11–45)
B-HCG UR QL: NEGATIVE
BARBITURATE SCN PRESENT UR: NEGATIVE
BASOPHILS # BLD AUTO: 0.06 K/UL
BASOPHILS NFR BLD AUTO: 0.7 %
BENZODIAZ UR QL SCN: NEGATIVE
BILIRUB SERPL-MCNC: 0.3 MG/DL (ref 0.1–1)
BUN SERPL-MCNC: 10 MG/DL (ref 6–20)
CALCIUM SERPL-MCNC: 9.1 MG/DL (ref 8.7–10.5)
CANNABINOIDS UR QL SCN: NEGATIVE
CHLORIDE SERPL-SCNC: 104 MMOL/L (ref 95–110)
CO2 SERPL-SCNC: 27 MMOL/L (ref 23–29)
COCAINE UR QL SCN: NEGATIVE
CREAT SERPL-MCNC: 0.7 MG/DL (ref 0.5–1.4)
CREAT UR-MCNC: 28 MG/DL (ref 15–325)
ERYTHROCYTE [DISTWIDTH] IN BLOOD BY AUTOMATED COUNT: 13.2 % (ref 11.5–14.5)
GFR SERPLBLD CREATININE-BSD FMLA CKD-EPI: >60 ML/MIN/1.73/M2
GLUCOSE SERPL-MCNC: 114 MG/DL (ref 70–110)
HCT VFR BLD AUTO: 43.2 % (ref 37–48.5)
HGB BLD-MCNC: 14.4 GM/DL (ref 12–16)
IMM GRANULOCYTES # BLD AUTO: 0.05 K/UL (ref 0–0.04)
IMM GRANULOCYTES NFR BLD AUTO: 0.6 % (ref 0–0.5)
LYMPHOCYTES # BLD AUTO: 1.68 K/UL (ref 1–4.8)
MCH RBC QN AUTO: 29.8 PG (ref 27–50)
MCHC RBC AUTO-ENTMCNC: 33.3 G/DL (ref 32–36)
MCV RBC AUTO: 89 FL (ref 82–98)
METHADONE UR QL SCN: NEGATIVE
NUCLEATED RBC (/100WBC) (OHS): 0 /100 WBC
OHS QRS DURATION: 82 MS
OHS QTC CALCULATION: 425 MS
OPIATES UR QL SCN: NEGATIVE
PCP UR QL: NEGATIVE
PLATELET # BLD AUTO: 307 K/UL (ref 150–450)
PMV BLD AUTO: 9.2 FL (ref 9.2–12.9)
POTASSIUM SERPL-SCNC: 4.4 MMOL/L (ref 3.5–5.1)
PROT SERPL-MCNC: 7.1 GM/DL (ref 6–8.4)
RBC # BLD AUTO: 4.83 M/UL (ref 4–5.4)
RELATIVE EOSINOPHIL (OHS): 1.2 %
RELATIVE LYMPHOCYTE (OHS): 18.8 % (ref 18–48)
RELATIVE MONOCYTE (OHS): 6.9 % (ref 4–15)
RELATIVE NEUTROPHIL (OHS): 71.8 % (ref 38–73)
SODIUM SERPL-SCNC: 138 MMOL/L (ref 136–145)
WBC # BLD AUTO: 8.96 K/UL (ref 3.9–12.7)

## 2025-03-25 PROCEDURE — 99223 1ST HOSP IP/OBS HIGH 75: CPT | Mod: ,,, | Performed by: PSYCHIATRY & NEUROLOGY

## 2025-03-25 PROCEDURE — 36415 COLL VENOUS BLD VENIPUNCTURE: CPT

## 2025-03-25 PROCEDURE — 80299 QUANTITATIVE ASSAY DRUG: CPT

## 2025-03-25 PROCEDURE — 95720 EEG PHY/QHP EA INCR W/VEEG: CPT | Mod: ,,, | Performed by: PSYCHIATRY & NEUROLOGY

## 2025-03-25 PROCEDURE — 81003 URINALYSIS AUTO W/O SCOPE: CPT | Mod: 59 | Performed by: PSYCHIATRY & NEUROLOGY

## 2025-03-25 PROCEDURE — 82040 ASSAY OF SERUM ALBUMIN: CPT

## 2025-03-25 PROCEDURE — 80307 DRUG TEST PRSMV CHEM ANLYZR: CPT

## 2025-03-25 PROCEDURE — 85025 COMPLETE CBC W/AUTO DIFF WBC: CPT

## 2025-03-25 PROCEDURE — 11000001 HC ACUTE MED/SURG PRIVATE ROOM

## 2025-03-25 PROCEDURE — 95714 VEEG EA 12-26 HR UNMNTR: CPT

## 2025-03-25 PROCEDURE — A4216 STERILE WATER/SALINE, 10 ML: HCPCS

## 2025-03-25 PROCEDURE — 25000003 PHARM REV CODE 250

## 2025-03-25 PROCEDURE — 95700 EEG CONT REC W/VID EEG TECH: CPT

## 2025-03-25 PROCEDURE — 81025 URINE PREGNANCY TEST: CPT

## 2025-03-25 PROCEDURE — 93005 ELECTROCARDIOGRAM TRACING: CPT

## 2025-03-25 PROCEDURE — 80183 DRUG SCRN QUANT OXCARBAZEPIN: CPT

## 2025-03-25 PROCEDURE — 93010 ELECTROCARDIOGRAM REPORT: CPT | Mod: ,,, | Performed by: INTERNAL MEDICINE

## 2025-03-25 PROCEDURE — 87086 URINE CULTURE/COLONY COUNT: CPT | Performed by: PSYCHIATRY & NEUROLOGY

## 2025-03-25 PROCEDURE — 80177 DRUG SCRN QUAN LEVETIRACETAM: CPT

## 2025-03-25 RX ORDER — ACETAMINOPHEN 325 MG/1
650 TABLET ORAL EVERY 4 HOURS PRN
Status: DISCONTINUED | OUTPATIENT
Start: 2025-03-25 | End: 2025-03-27 | Stop reason: HOSPADM

## 2025-03-25 RX ORDER — SUCRALFATE 1 G/10ML
1 SUSPENSION ORAL
Status: DISCONTINUED | OUTPATIENT
Start: 2025-03-25 | End: 2025-03-27 | Stop reason: HOSPADM

## 2025-03-25 RX ORDER — TAMSULOSIN HYDROCHLORIDE 0.4 MG/1
0.4 CAPSULE ORAL NIGHTLY
Status: DISCONTINUED | OUTPATIENT
Start: 2025-03-25 | End: 2025-03-27 | Stop reason: HOSPADM

## 2025-03-25 RX ORDER — FLUTICASONE PROPIONATE 50 MCG
1 SPRAY, SUSPENSION (ML) NASAL DAILY PRN
Status: DISCONTINUED | OUTPATIENT
Start: 2025-03-25 | End: 2025-03-27 | Stop reason: HOSPADM

## 2025-03-25 RX ORDER — PREDNISONE 5 MG/1
10 TABLET ORAL DAILY
Status: DISCONTINUED | OUTPATIENT
Start: 2025-03-26 | End: 2025-03-27 | Stop reason: HOSPADM

## 2025-03-25 RX ORDER — LORAZEPAM 2 MG/ML
2 INJECTION INTRAMUSCULAR EVERY 5 MIN PRN
Status: DISCONTINUED | OUTPATIENT
Start: 2025-03-25 | End: 2025-03-27 | Stop reason: HOSPADM

## 2025-03-25 RX ORDER — SODIUM CHLORIDE 0.9 % (FLUSH) 0.9 %
10 SYRINGE (ML) INJECTION
Status: DISCONTINUED | OUTPATIENT
Start: 2025-03-25 | End: 2025-03-27 | Stop reason: HOSPADM

## 2025-03-25 RX ORDER — PREDNISONE 10 MG/1
10 TABLET ORAL DAILY
COMMUNITY
Start: 2025-03-10

## 2025-03-25 RX ORDER — HYDROXYCHLOROQUINE SULFATE 200 MG/1
200 TABLET, FILM COATED ORAL 2 TIMES DAILY
Status: DISCONTINUED | OUTPATIENT
Start: 2025-03-25 | End: 2025-03-27 | Stop reason: HOSPADM

## 2025-03-25 RX ORDER — DOCUSATE SODIUM 100 MG/1
100 CAPSULE, LIQUID FILLED ORAL 2 TIMES DAILY
Status: DISCONTINUED | OUTPATIENT
Start: 2025-03-25 | End: 2025-03-26

## 2025-03-25 RX ADMIN — ACETAMINOPHEN 650 MG: 325 TABLET ORAL at 08:03

## 2025-03-25 RX ADMIN — TAMSULOSIN HYDROCHLORIDE 0.4 MG: 0.4 CAPSULE ORAL at 08:03

## 2025-03-25 RX ADMIN — SUCRALFATE 1 G: 1 SUSPENSION ORAL at 08:03

## 2025-03-25 RX ADMIN — HYDROXYCHLOROQUINE SULFATE 200 MG: 200 TABLET, FILM COATED ORAL at 08:03

## 2025-03-25 RX ADMIN — Medication 10 ML: at 08:03
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
## 2025-03-25 NOTE — ASSESSMENT & PLAN NOTE
33 yo F with seizure-like episodes of unclear etiology, with both family- and self-reported semiologies suggestive of generalized convulsions and non-motor events. Episodes may begin with migraine, metallic taste, or visual aura, followed by behavioral arrest and bilateral limb shaking. Family has witnessed episodes with preserved speech prior to a staring spell and convulsion. Tongue biting and incontinence have been reported. Seizure frequency remains every 1-2 weeks, with most recent event occurring four days ago post-anesthesia. She is on levETIRAcetam, oxcarbazepine, and initiating Xcopri. Past vEEG showed no epileptiform activity. Brain imaging has been unrevealing. Her son also has seizures. She has a complex psychiatric history and early-life trauma. Evaluation for PNEE vs epilepsy is ongoing in the EMU.    -- hold oxcarbazepine, xcorpi, levetiracetam, clonazepam  -- check levETIRAcetam and oxcarbazepine levels on admission  -- continuous vEEG  -- PRN IV ativan for GTC >5 minutes, notify epilepsy on call before administering  -- seizure precautions, suction and oxygen at bedside  -- fall precautions, side rail padding in place  -- Visi monitoring with continuous pulse oximetry  -- telemetry

## 2025-03-25 NOTE — SUBJECTIVE & OBJECTIVE
"Past Medical History:   Diagnosis Date    ADHD (attention deficit hyperactivity disorder)     Allergy     Anxiety     Asthma     Depression     Hyperlipidemia     Systemic lupus erythematosus, organ or system involvement unspecified        Past Surgical History:   Procedure Laterality Date    APPENDECTOMY      HAND SURGERY Right     HYSTERECTOMY      KNEE ARTHROSCOPY W/ ACL RECONSTRUCTION         Review of patient's allergies indicates:   Allergen Reactions    Aimovig autoinjector [erenumab-aooe] Blisters     Hives/blisters on scalp       Cymbalta [duloxetine] Other (See Comments)     "Makes me crazy"    Topamax [topiramate] Other (See Comments)     "Makes me crazy"    Tramadol Nausea And Vomiting       No current facility-administered medications on file prior to encounter.     Current Outpatient Medications on File Prior to Encounter   Medication Sig    cariprazine (VRAYLAR) 1.5 mg Cap Take 3 mg by mouth once daily.    clonazePAM (KLONOPIN) 1 MG tablet Take 1 mg by mouth nightly as needed (seizures).    dextroamphetamine-amphetamine 30 mg Tab Take 1 tablet by mouth 2 (two) times daily.    diclofenac sodium (SOLARAZE) 3 % gel Apply 1 application  topically 2 (two) times a day.    FLUoxetine 20 MG capsule Take 20 mg by mouth once daily.    fluticasone propionate (FLONASE) 50 mcg/actuation nasal spray 1 spray by Each Nostril route daily as needed for Allergies.    hydroxychloroquine (PLAQUENIL) 200 mg tablet Take 1 tablet (200 mg total) by mouth 2 (two) times daily.    hydrOXYzine pamoate (VISTARIL) 25 MG Cap Take 25-50 mg by mouth every evening.    levETIRAcetam (KEPPRA) 750 MG Tab Take 2 tablets (1,500 mg total) by mouth 2 (two) times daily.    lifitegrast (XIIDRA) 5 % Dpet Place 1 drop into both eyes 2 (two) times daily.    linaCLOtide (LINZESS) 72 mcg Cap capsule Take 1 capsule (72 mcg total) by mouth before breakfast.    midazolam 5 mg/spray (0.1 mL) Spry 1 Dose by Nasal route daily as needed (PRN Seizure " activity).    moxifloxacin (VIGAMOX) 0.5 % ophthalmic solution Place 1 drop into the left eye 4 (four) times daily.    naproxen (NAPROSYN) 500 MG tablet Take 1 tablet (500 mg total) by mouth 2 (two) times daily as needed.    norethindrone (MICRONOR) 0.35 mg tablet Take 0.35 mg by mouth once daily.    NURTEC 75 mg odt Take 75 mg by mouth every other day.    omeprazole (PRILOSEC) 20 MG capsule Take 1 capsule (20 mg total) by mouth once daily.    ondansetron (ZOFRAN-ODT) 4 MG TbDL Take 1 tablet (4 mg total) by mouth every 8 (eight) hours as needed (nausea).    orphenadrine (NORFLEX) 100 mg tablet Take 100 mg by mouth 2 (two) times daily as needed.    OXcarbazepine (TRILEPTAL) 150 MG Tab Take 1 tablet (150 mg total) by mouth 2 (two) times daily.    prazosin (MINIPRESS) 5 MG capsule Take 5 mg by mouth every evening.    prednisoLONE acetate (PRED FORTE) 1 % DrpS Place 1 drop into the left eye 4 (four) times daily.    rizatriptan (MAXALT) 10 MG tablet Take 10 mg by mouth daily as needed.    tamsulosin (FLOMAX) 0.4 mg Cap Take 0.4 mg by mouth once daily.    UBRELVY 100 mg tablet Take by mouth.    VENTOLIN HFA 90 mcg/actuation inhaler Inhale 1-2 puffs into the lungs every 6 (six) hours as needed for Wheezing or Shortness of Breath.     Continuous Infusions:    Family History       Problem Relation (Age of Onset)    Alcohol abuse Mother, Father    Arthritis Mother    COPD Mother    Diabetes Father    Drug abuse Mother    Heart disease Father    Hypertension Mother    Mental illness Mother          Tobacco Use    Smoking status: Never     Passive exposure: Past    Smokeless tobacco: Never   Substance and Sexual Activity    Alcohol use: Not Currently    Drug use: Yes     Comment: THC    Sexual activity: Yes     Partners: Female     Birth control/protection: See Surgical Hx     Review of Systems   Constitutional:  Negative for chills, fatigue and fever.   HENT:  Negative for hearing loss, sore throat and trouble swallowing.     Eyes:  Negative for photophobia and visual disturbance.   Respiratory:  Negative for cough, shortness of breath and wheezing.    Cardiovascular:  Negative for palpitations and leg swelling.   Gastrointestinal:  Negative for abdominal pain, nausea and vomiting.   Genitourinary:  Negative for difficulty urinating, dysuria and frequency.   Musculoskeletal:  Negative for back pain, gait problem and neck pain.   Neurological:  Positive for seizures. Negative for speech difficulty and headaches.   Psychiatric/Behavioral:  Negative for agitation, behavioral problems and confusion.      Objective:     Vital Signs (Most Recent):    Vital Signs (24h Range):  BP: ()/()   Arterial Line BP: ()/()         There is no height or weight on file to calculate BMI.     Physical Exam  Vitals reviewed.   Constitutional:       General: She is not in acute distress.     Appearance: She is not diaphoretic.   HENT:      Head: Normocephalic and atraumatic.   Eyes:      General: No scleral icterus.        Right eye: No discharge.         Left eye: No discharge.      Extraocular Movements: Extraocular movements intact.      Conjunctiva/sclera: Conjunctivae normal.   Cardiovascular:      Rate and Rhythm: Normal rate.   Pulmonary:      Effort: Pulmonary effort is normal. No respiratory distress.   Abdominal:      General: There is no distension.      Palpations: Abdomen is soft.   Musculoskeletal:         General: No swelling, tenderness or deformity.   Skin:     General: Skin is warm and dry.   Psychiatric:         Mood and Affect: Mood normal.         Behavior: Behavior normal.               LOC: alert  Attention Span: Good   Language: No aphasia  Articulation: No dysarthria  Orientation: Person, Place, Time   Visual Fields: Full  EOM (CN III, IV, VI): Full/intact  Pupils (CN II, III): PERRL  Facial Sensation (CN V): Normal  Facial Movement (CN VII): Symmetric facial expression    Reflexes: 2+ throughout  Motor: Arm left  Normal 5/5  Leg left   Normal 5/5  Arm right  Normal 5/5  Leg right Normal 5/5  Cerebellum: No evidence of appendicular or axial ataxia  Sensation: Intact to light touch  Tone: Normal tone throughout      Significant Labs: All pertinent lab results from the past 24 hours have been reviewed.    Significant Studies: I have reviewed all pertinent imaging results/findings within the past 24 hours.
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
## 2025-03-26 LAB
BACTERIA #/AREA URNS AUTO: ABNORMAL /HPF
BILIRUB UR QL STRIP.AUTO: NEGATIVE
CLARITY UR: ABNORMAL
COLOR UR AUTO: YELLOW
GLUCOSE UR QL STRIP: NEGATIVE
HGB UR QL STRIP: ABNORMAL
HYALINE CASTS UR QL AUTO: 0 /LPF (ref 0–1)
KETONES UR QL STRIP: NEGATIVE
LEUKOCYTE ESTERASE UR QL STRIP: ABNORMAL
MICROSCOPIC COMMENT: ABNORMAL
NITRITE UR QL STRIP: NEGATIVE
PH UR STRIP: 7 [PH]
PROT UR QL STRIP: ABNORMAL
RBC #/AREA URNS AUTO: >100 /HPF (ref 0–4)
SP GR UR STRIP: >=1.03
SQUAMOUS #/AREA URNS AUTO: 1 /HPF
UROBILINOGEN UR STRIP-ACNC: NEGATIVE EU/DL
WBC #/AREA URNS AUTO: 86 /HPF (ref 0–5)

## 2025-03-26 PROCEDURE — A4216 STERILE WATER/SALINE, 10 ML: HCPCS

## 2025-03-26 PROCEDURE — 95714 VEEG EA 12-26 HR UNMNTR: CPT

## 2025-03-26 PROCEDURE — 99233 SBSQ HOSP IP/OBS HIGH 50: CPT | Mod: ,,, | Performed by: PHYSICIAN ASSISTANT

## 2025-03-26 PROCEDURE — 25000003 PHARM REV CODE 250

## 2025-03-26 PROCEDURE — 99222 1ST HOSP IP/OBS MODERATE 55: CPT | Mod: ,,, | Performed by: UROLOGY

## 2025-03-26 PROCEDURE — 11000001 HC ACUTE MED/SURG PRIVATE ROOM

## 2025-03-26 PROCEDURE — 25000003 PHARM REV CODE 250: Performed by: PHYSICIAN ASSISTANT

## 2025-03-26 PROCEDURE — 99499 UNLISTED E&M SERVICE: CPT | Mod: ,,, | Performed by: PSYCHIATRY & NEUROLOGY

## 2025-03-26 PROCEDURE — 63600175 PHARM REV CODE 636 W HCPCS

## 2025-03-26 RX ORDER — IPRATROPIUM BROMIDE AND ALBUTEROL SULFATE 2.5; .5 MG/3ML; MG/3ML
3 SOLUTION RESPIRATORY (INHALATION) EVERY 4 HOURS PRN
Status: DISCONTINUED | OUTPATIENT
Start: 2025-03-26 | End: 2025-03-26

## 2025-03-26 RX ORDER — NITROFURANTOIN 25; 75 MG/1; MG/1
100 CAPSULE ORAL EVERY 12 HOURS
Status: DISCONTINUED | OUTPATIENT
Start: 2025-03-26 | End: 2025-03-27 | Stop reason: HOSPADM

## 2025-03-26 RX ORDER — IPRATROPIUM BROMIDE AND ALBUTEROL SULFATE 2.5; .5 MG/3ML; MG/3ML
3 SOLUTION RESPIRATORY (INHALATION) EVERY 4 HOURS
Status: DISCONTINUED | OUTPATIENT
Start: 2025-03-26 | End: 2025-03-27 | Stop reason: HOSPADM

## 2025-03-26 RX ORDER — ACETYLCYSTEINE 100 MG/ML
4 SOLUTION ORAL; RESPIRATORY (INHALATION)
Status: DISCONTINUED | OUTPATIENT
Start: 2025-03-26 | End: 2025-03-26

## 2025-03-26 RX ORDER — DOCUSATE SODIUM 100 MG/1
100 CAPSULE, LIQUID FILLED ORAL 2 TIMES DAILY PRN
Status: DISCONTINUED | OUTPATIENT
Start: 2025-03-26 | End: 2025-03-27 | Stop reason: HOSPADM

## 2025-03-26 RX ORDER — ALBUTEROL SULFATE 90 UG/1
2 INHALANT RESPIRATORY (INHALATION) EVERY 6 HOURS PRN
Status: DISCONTINUED | OUTPATIENT
Start: 2025-03-26 | End: 2025-03-26

## 2025-03-26 RX ORDER — BENZONATATE 100 MG/1
100 CAPSULE ORAL 3 TIMES DAILY PRN
Status: DISCONTINUED | OUTPATIENT
Start: 2025-03-26 | End: 2025-03-27 | Stop reason: HOSPADM

## 2025-03-26 RX ORDER — HYDROXYZINE PAMOATE 25 MG/1
50 CAPSULE ORAL NIGHTLY
Status: DISCONTINUED | OUTPATIENT
Start: 2025-03-26 | End: 2025-03-27 | Stop reason: HOSPADM

## 2025-03-26 RX ORDER — CETIRIZINE HYDROCHLORIDE 5 MG/1
5 TABLET ORAL DAILY
Status: DISCONTINUED | OUTPATIENT
Start: 2025-03-26 | End: 2025-03-26

## 2025-03-26 RX ORDER — MAGNESIUM SULFATE HEPTAHYDRATE 40 MG/ML
2 INJECTION, SOLUTION INTRAVENOUS ONCE
Status: COMPLETED | OUTPATIENT
Start: 2025-03-26 | End: 2025-03-26

## 2025-03-26 RX ORDER — KETOROLAC TROMETHAMINE 30 MG/ML
15 INJECTION, SOLUTION INTRAMUSCULAR; INTRAVENOUS EVERY 8 HOURS PRN
Status: DISCONTINUED | OUTPATIENT
Start: 2025-03-26 | End: 2025-03-27 | Stop reason: HOSPADM

## 2025-03-26 RX ORDER — NORETHINDRONE 0.35 MG/1
1 TABLET ORAL DAILY
Status: DISCONTINUED | OUTPATIENT
Start: 2025-03-27 | End: 2025-03-27 | Stop reason: HOSPADM

## 2025-03-26 RX ORDER — ONDANSETRON 4 MG/1
4 TABLET, ORALLY DISINTEGRATING ORAL EVERY 8 HOURS PRN
Status: DISCONTINUED | OUTPATIENT
Start: 2025-03-26 | End: 2025-03-27 | Stop reason: HOSPADM

## 2025-03-26 RX ADMIN — Medication 10 ML: at 09:03

## 2025-03-26 RX ADMIN — HYDROXYCHLOROQUINE SULFATE 200 MG: 200 TABLET, FILM COATED ORAL at 07:03

## 2025-03-26 RX ADMIN — PREDNISONE 10 MG: 5 TABLET ORAL at 07:03

## 2025-03-26 RX ADMIN — FLUOXETINE HYDROCHLORIDE 30 MG: 10 CAPSULE ORAL at 07:03

## 2025-03-26 RX ADMIN — CETIRIZINE HYDROCHLORIDE 5 MG: 5 TABLET, FILM COATED ORAL at 11:03

## 2025-03-26 RX ADMIN — HYPROMELLOSE 2910 1 DROP: 5 SOLUTION/ DROPS OPHTHALMIC at 09:03

## 2025-03-26 RX ADMIN — TAMSULOSIN HYDROCHLORIDE 0.4 MG: 0.4 CAPSULE ORAL at 08:03

## 2025-03-26 RX ADMIN — BENZONATATE 100 MG: 100 CAPSULE ORAL at 04:03

## 2025-03-26 RX ADMIN — HYDROXYZINE PAMOATE 50 MG: 25 CAPSULE ORAL at 08:03

## 2025-03-26 RX ADMIN — SUCRALFATE 1 G: 1 SUSPENSION ORAL at 04:03

## 2025-03-26 RX ADMIN — SUCRALFATE 1 G: 1 SUSPENSION ORAL at 08:03

## 2025-03-26 RX ADMIN — HYDROXYCHLOROQUINE SULFATE 200 MG: 200 TABLET, FILM COATED ORAL at 08:03

## 2025-03-26 RX ADMIN — MAGNESIUM SULFATE HEPTAHYDRATE 2 G: 40 INJECTION, SOLUTION INTRAVENOUS at 09:03

## 2025-03-26 RX ADMIN — SUCRALFATE 1 G: 1 SUSPENSION ORAL at 11:03

## 2025-03-26 RX ADMIN — NITROFURANTOIN MONOHYDRATE/MACROCRYSTALS 100 MG: 25; 75 CAPSULE ORAL at 08:03

## 2025-03-26 RX ADMIN — SUCRALFATE 1 G: 1 SUSPENSION ORAL at 06:03

## 2025-03-26 RX ADMIN — ONDANSETRON 4 MG: 4 TABLET, ORALLY DISINTEGRATING ORAL at 09:03

## 2025-03-26 RX ADMIN — KETOROLAC TROMETHAMINE 15 MG: 30 INJECTION, SOLUTION INTRAMUSCULAR; INTRAVENOUS at 09:03

## 2025-03-26 NOTE — SUBJECTIVE & OBJECTIVE
"Past Medical History:   Diagnosis Date    ADHD (attention deficit hyperactivity disorder)     Allergy     Anxiety     Asthma     Depression     Hyperlipidemia     Systemic lupus erythematosus, organ or system involvement unspecified        Past Surgical History:   Procedure Laterality Date    APPENDECTOMY      HAND SURGERY Right     HYSTERECTOMY      KNEE ARTHROSCOPY W/ ACL RECONSTRUCTION         Review of patient's allergies indicates:   Allergen Reactions    Aimovig autoinjector [erenumab-aooe] Blisters     Hives/blisters on scalp       Cymbalta [duloxetine] Other (See Comments)     "Makes me crazy"    Topamax [topiramate] Other (See Comments)     "Makes me crazy"    Tramadol Nausea And Vomiting       Family History       Problem Relation (Age of Onset)    Alcohol abuse Mother, Father    Arthritis Mother    COPD Mother    Diabetes Father    Drug abuse Mother    Heart disease Father    Hypertension Mother    Mental illness Mother            Tobacco Use    Smoking status: Never     Passive exposure: Past    Smokeless tobacco: Never   Substance and Sexual Activity    Alcohol use: Not Currently    Drug use: Yes     Comment: THC    Sexual activity: Yes     Partners: Female     Birth control/protection: See Surgical Hx       Review of Systems: per HPI    Objective:     Temp:  [97.8 °F (36.6 °C)-98.8 °F (37.1 °C)] 98.6 °F (37 °C)  Pulse:  [] 97  Resp:  [18] 18  SpO2:  [95 %-98 %] 95 %  BP: (117-154)/(68-99) 123/76  Weight: 85 kg (187 lb 6.3 oz)  Body mass index is 31.18 kg/m².           Drains       None                    Physical Exam  Vitals and nursing note reviewed.   Constitutional:       General: She is not in acute distress.     Appearance: She is not ill-appearing or toxic-appearing.   Cardiovascular:      Rate and Rhythm: Normal rate.   Pulmonary:      Effort: No respiratory distress.      Breath sounds: No wheezing.   Abdominal:      General: Abdomen is flat. There is no distension.      Palpations: " Abdomen is soft.      Tenderness: There is no abdominal tenderness. There is no right CVA tenderness, left CVA tenderness or guarding.   Neurological:      Mental Status: She is alert and oriented to person, place, and time.          Significant Labs:    BMP:  Recent Labs   Lab 03/25/25  1133      K 4.4      CO2 27   BUN 10   CREATININE 0.7   GLUCOSE 114*   CALCIUM 9.1       CBC:  Recent Labs   Lab 03/25/25  1133   WBC 8.96   HGB 14.4   HCT 43.2          All pertinent labs results from the past 24 hours have been reviewed.    Significant Imaging:  All pertinent imaging results/findings from the past 24 hours have been reviewed.

## 2025-03-26 NOTE — PROGRESS NOTES
[facility] - EMU  Neurology-Epilepsy  Progress Note    Patient Name: Marcy Romo  MRN: [id number]  Admission Date: 3/25/2025  Hospital Length of Stay: 1 days  Code Status: Full Code   Attending Provider: Kedar Spencer MD  Primary Care Physician: Kaylie Ahuja MD   Principal Problem:Seizure-like activity    Subjective:     Hospital Course:   3/25>3/26: Admit to EMU, home Levetiracetam, Oxcarbazepine, Cenobamate, Clonazepam held on admission. Patient completed sleep deprivation overnight. No typical events captured since admission, EEG normal.     Interval History: Completed sleep deprivation overnight, no typical events captured since admission. Required straight cath overnight, has chronic urinary retention. Reports increased hematuria, plan for urology evaluation. Plan of care discussed in detail with patient at bedside.     Current Medications[1]  Continuous Infusions:    Review of Systems   Genitourinary:  Positive for difficulty urinating and hematuria.   Neurological:  Negative for speech difficulty.     Objective:     Vital Signs (Most Recent):  Temp: 98.6 °F (37 °C) (03/26/25 1139)  Pulse: 97 (03/26/25 1139)  Resp: 18 (03/26/25 1139)  BP: 123/76 (03/26/25 1139)  SpO2: 95 % (03/26/25 1139) Vital Signs (24h Range):  Temp:  [97.8 °F (36.6 °C)-98.8 °F (37.1 °C)] 98.6 °F (37 °C)  Pulse:  [] 97  Resp:  [18] 18  SpO2:  [95 %-98 %] 95 %  BP: (117-154)/(68-99) 123/76     Weight: 85 kg (187 lb 6.3 oz)  Body mass index is 31.18 kg/m².     Physical Exam  Constitutional:       General: She is not in acute distress.     Appearance: She is not diaphoretic.   HENT:      Head: Normocephalic and atraumatic.      Comments: EEG in place  Eyes:      General: No scleral icterus.     Extraocular Movements: Extraocular movements intact.   Pulmonary:      Effort: Pulmonary effort is normal. No respiratory distress.   Musculoskeletal:         General: No deformity or signs of injury.      Cervical back: Neck supple. No  rigidity.   Skin:     General: Skin is warm and dry.   Neurological:      Mental Status: She is alert and oriented to person, place, and time.   Psychiatric:         Mood and Affect: Mood normal.         Speech: Speech normal.         Behavior: Behavior normal.            NEUROLOGICAL EXAMINATION:     MENTAL STATUS   Oriented to person, place, and time.   Attention: normal. Concentration: normal.   Speech: speech is normal   Level of consciousness: alert    CRANIAL NERVES        CN II-XII grossly intact        Significant Labs: All pertinent lab results from the past 24 hours have been reviewed.    Significant Studies: I have reviewed all pertinent imaging results/findings within the past 24 hours.    Assessment and Plan:     * Seizure-like activity  34F PMHx chronic headaches, elevated LFTs, urinary retention admitted to EMU for capture and characterization of events of headache, metallic taste, or visual aura, followed by behavioral arrest and bilateral limb shaking, at times associated with tongue biting or urinary incontinence. Patient reports events approximately every 1-2 weeks, with most recent event occurring four days prior to admission. Events can last up to 10-15 minutes. She is currently maintained on Levetiracetam 2000 mg BID, Oxcarbazepine 300 mg BID, and Cenobamate 12.5 mg daily. Prior vEEG showed no epileptiform activity. Brain imaging has been unrevealing.     - Continuous vEEG - no typical events captured since admission, EEG normal  - Home Levetiracetam, Oxcarbazepine, Cenobamate held on admission  - AED levels pending  - Activation procedures per protocol- medication adjustments as above, completed sleep deprivation 3/25>3/26, HV/photic pending  - IV Ativan PRN for GTC greater than 5 min - please call epilepsy on call   - Seizure precautions, suction and oxygen at bedside  - Fall precautions, side rail padding in place  - Visi monitoring with continuous pulse oximetry   - Telemetry    Migraine  without status migrainosus, not intractable  -- Continue home Nurtec qod, Ubrelvy PRN    Urinary retention  - Previously evaluated by Urology (Dr. Leslie), records unavailable for review  - Utilizes straight cath PRN as outpatient, continue during admission  - Endorses increased blood during straight catheterization - Urology consulted 3/26, appreciate their assistance    Transaminitis  - Chronic, LFTs on admission stable/decreased from prior labs 3/2/25  - Followed by PCP, pending Hepatology appt         VTE Risk Mitigation (From admission, onward)           Ordered     IP VTE HIGH RISK PATIENT  Once         03/25/25 1118     Place sequential compression device  Until discontinued         03/25/25 1118                    Tila Bruno PA-C  Neurology-Epilepsy  [facility]  Staff: Dr. Spencer       [1]   Current Facility-Administered Medications   Medication Dose Route Frequency Provider Last Rate Last Admin    acetaminophen tablet 650 mg  650 mg Oral Q4H PRN Juan Massey MD   650 mg at 03/25/25 2022    albuterol inhaler 2 puff  2 puff Inhalation Q6H PRN Tila Bruno PA-C        artificial tears 0.5 % ophthalmic solution 1 drop  1 drop Both Eyes BID Juan Massey MD   1 drop at 03/26/25 0924    benzonatate capsule 100 mg  100 mg Oral TID PRN Tila Bruno PA-C        cetirizine tablet 5 mg  5 mg Oral Daily Tila Bruno PA-C   5 mg at 03/26/25 1112    docusate sodium capsule 100 mg  100 mg Oral BID PRN Tila Bruno PA-C        FLUoxetine capsule 30 mg  30 mg Oral Daily Juan Massey MD   30 mg at 03/26/25 0748    fluticasone propionate 50 mcg/actuation nasal spray 50 mcg  1 spray Each Nostril Daily PRN Juan Massey MD        hydroxychloroquine tablet 200 mg  200 mg Oral BID Juan Massey MD   200 mg at 03/26/25 0746    LORazepam injection 2 mg  2 mg Intravenous Q5 Min PRN Juan Massey MD        [START ON 3/27/2025] norethindrone tablet 0.35 mg - HOME MED  1 tablet Oral Daily Favio Sheppard  Sam HELM MD        predniSONE tablet 10 mg  10 mg Oral Daily Juan Massey MD   10 mg at 03/26/25 0746    [START ON 3/28/2025] rimegepant [NURTEC] 75 mg odt - HOME MED  75 mg Oral Every other day Sam Ledesma MD        sodium chloride 0.9% flush 10 mL  10 mL Intravenous PRN Juan Massey MD   10 mL at 03/25/25 2058    sucralfate 100 mg/mL suspension 1 g  1 g Oral QID (AC & HS) Juan Massey MD   1 g at 03/26/25 1112    tamsulosin 24 hr capsule 0.4 mg  0.4 mg Oral Nightly Juan Massey MD   0.4 mg at 03/25/25 2022    ubrogepant [UBRELVY] 100 mg tablet - HOME MED  100 mg Oral Daily PRN Sam Ledesma MD

## 2025-03-26 NOTE — CONSULTS
[facility] ([facility])  Urology  Consult Note    Patient Name: Marcy Romo  MRN: [id number]  Admission Date: 3/25/2025  Hospital Length of Stay: 1   Code Status: Full Code   Attending Provider: Kedar Spencer MD   Consulting Provider: Carlos Goff MD  Primary Care Physician: Kaylie Ahuja MD  Principal Problem:Seizure-like activity    Inpatient consult to Urology  Consult performed by: Carlos Goff MD  Consult ordered by: Juan Massey MD  Reason for consult: gross hematuria        Subjective:     HPI:  Marcy Romo is a 34 y.o. female who is currently admitted for seizure-like events that began in adulthood and undergoing workup. She also currently being worked up by Dr. Leslie with urology for inability to urinate likely due to neurogenic bladder. She has performed self-catheterization 4-5x/day for the past 2 months. She denies any difficulty with performing this. Urology team consulted for gross hematuria. Patient states she noticed this about 2 days ago. At that time she also began to develop bladder spasms. She describes her urine as fruit punch color and has noticed possibly a few small clots. She states she feels she is able to empty her bladder however has noticed some increased urgency requiring more frequent self-catheterization.     AF. VSS. UA demonstrated negative nitrite and 3+ leukocyte esterase. Micro UA demonstrated few bacteria and >100RBCs. Creatinine is at baseline at 0.7. She is not currently on any antibiotics or blood thinners.     Past Medical History:   Diagnosis Date    ADHD (attention deficit hyperactivity disorder)     Allergy     Anxiety     Asthma     Depression     Hyperlipidemia     Systemic lupus erythematosus, organ or system involvement unspecified        Past Surgical History:   Procedure Laterality Date    APPENDECTOMY      HAND SURGERY Right     HYSTERECTOMY      KNEE ARTHROSCOPY W/ ACL RECONSTRUCTION         Review of patient's allergies indicates:  "  Allergen Reactions    Aimovig autoinjector [erenumab-aooe] Blisters     Hives/blisters on scalp       Cymbalta [duloxetine] Other (See Comments)     "Makes me crazy"    Topamax [topiramate] Other (See Comments)     "Makes me crazy"    Tramadol Nausea And Vomiting       Family History       Problem Relation (Age of Onset)    Alcohol abuse Mother, Father    Arthritis Mother    COPD Mother    Diabetes Father    Drug abuse Mother    Heart disease Father    Hypertension Mother    Mental illness Mother            Tobacco Use    Smoking status: Never     Passive exposure: Past    Smokeless tobacco: Never   Substance and Sexual Activity    Alcohol use: Not Currently    Drug use: Yes     Comment: THC    Sexual activity: Yes     Partners: Female     Birth control/protection: See Surgical Hx       Review of Systems: per HPI    Objective:     Temp:  [97.8 °F (36.6 °C)-98.8 °F (37.1 °C)] 98.6 °F (37 °C)  Pulse:  [] 97  Resp:  [18] 18  SpO2:  [95 %-98 %] 95 %  BP: (117-154)/(68-99) 123/76  Weight: 85 kg (187 lb 6.3 oz)  Body mass index is 31.18 kg/m².           Drains       None                    Physical Exam  Vitals and nursing note reviewed.   Constitutional:       General: She is not in acute distress.     Appearance: She is not ill-appearing or toxic-appearing.   Cardiovascular:      Rate and Rhythm: Normal rate.   Pulmonary:      Effort: No respiratory distress.      Breath sounds: No wheezing.   Abdominal:      General: Abdomen is flat. There is no distension.      Palpations: Abdomen is soft.      Tenderness: There is no abdominal tenderness. There is no right CVA tenderness, left CVA tenderness or guarding.   Neurological:      Mental Status: She is alert and oriented to person, place, and time.          Significant Labs:    BMP:  Recent Labs   Lab 03/25/25  1133      K 4.4      CO2 27   BUN 10   CREATININE 0.7   GLUCOSE 114*   CALCIUM 9.1       CBC:  Recent Labs   Lab 03/25/25  1133   WBC 8.96   HGB " 14.4   HCT 43.2          All pertinent labs results from the past 24 hours have been reviewed.    Significant Imaging:  All pertinent imaging results/findings from the past 24 hours have been reviewed.      Assessment and Plan:     Urinary retention  Marcy Romo is a 34 y.o. female who is currently admitted for seizure-like events that began in adulthood and undergoing workup. She also currently being worked up by Dr. Leslie with urology for inability to urinate likely due to neurogenic bladder.    -- Patient seen and examined  -- Recommend antibiotic coverage for possible gram positive UTI; can tailor to sensitivities once culture results  -- PVR 0; no concerns for retention at this time  -- Recommend outpatient workup for gross hematuria; okay for this to be done with current urologist Dr. Leslie  -- Okay for patient to continue self-catheterization  -- Rest of care per primary    Urology will sign off. Please contact with any questions or concerns.         Carlos Goff MD  Urology  [facility] - Neurosurgery ([facility])    I have reviewed and concur with the resident's history, physical, assessment, and plan.  I have personally interviewed and examined the patient at bedside.  See below addendum for my evaluation and additional findings.  F/u with Dr. Leslie following discharge.

## 2025-03-26 NOTE — ASSESSMENT & PLAN NOTE
34F PMHx chronic headaches, elevated LFTs, urinary retention admitted to EMU for capture and characterization of events of headache, metallic taste, or visual aura, followed by behavioral arrest and bilateral limb shaking, at times associated with tongue biting or urinary incontinence. Patient reports events approximately every 1-2 weeks, with most recent event occurring four days prior to admission. Events can last up to 10-15 minutes. She is currently maintained on Levetiracetam 2000 mg BID, Oxcarbazepine 300 mg BID, and Cenobamate 12.5 mg daily. Prior vEEG showed no epileptiform activity. Brain imaging has been unrevealing.     - Continuous vEEG - no typical events captured since admission, EEG normal  - Home Levetiracetam, Oxcarbazepine, Cenobamate held on admission  - AED levels pending  - Activation procedures per protocol- medication adjustments as above, completed sleep deprivation 3/25>3/26, HV/photic pending  - IV Ativan PRN for GTC greater than 5 min - please call epilepsy on call   - Seizure precautions, suction and oxygen at bedside  - Fall precautions, side rail padding in place  - Visi monitoring with continuous pulse oximetry   - Telemetry

## 2025-03-26 NOTE — HPI
Marcy Romo is a 34 y.o. female who is currently admitted for seizure-like events that began in adulthood and undergoing workup. She also currently being worked up by Dr. Leslie with urology for inability to urinate likely due to neurogenic bladder. She has performed self-catheterization 4-5x/day for the past 2 months. She denies any difficulty with performing this. Urology team consulted for gross hematuria. Patient states she noticed this about 2 days ago. At that time she also began to develop bladder spasms. She describes her urine as fruit punch color and has noticed possibly a few small clots. She states she feels she is able to empty her bladder however has noticed some increased urgency requiring more frequent self-catheterization.     AF. VSS. UA demonstrated negative nitrite and 3+ leukocyte esterase. Micro UA demonstrated few bacteria and >100RBCs. Creatinine is at baseline at 0.7. She is not currently on any antibiotics or blood thinners.

## 2025-03-26 NOTE — ASSESSMENT & PLAN NOTE
- Previously evaluated by Urology (Dr. Leslie), records unavailable for review  - Utilizes straight cath PRN as outpatient, continue during admission  - Endorses increased blood during straight catheterization - Urology consulted 3/26, appreciate their assistance

## 2025-03-26 NOTE — SUBJECTIVE & OBJECTIVE
Interval History: Completed sleep deprivation overnight, no typical events captured since admission. Required straight cath overnight, has chronic urinary retention. Reports increased hematuria, plan for urology evaluation. Plan of care discussed in detail with patient at bedside.     Current Medications[1]  Continuous Infusions:    Review of Systems   Genitourinary:  Positive for difficulty urinating and hematuria.   Neurological:  Negative for speech difficulty.     Objective:     Vital Signs (Most Recent):  Temp: 98.6 °F (37 °C) (03/26/25 1139)  Pulse: 97 (03/26/25 1139)  Resp: 18 (03/26/25 1139)  BP: 123/76 (03/26/25 1139)  SpO2: 95 % (03/26/25 1139) Vital Signs (24h Range):  Temp:  [97.8 °F (36.6 °C)-98.8 °F (37.1 °C)] 98.6 °F (37 °C)  Pulse:  [] 97  Resp:  [18] 18  SpO2:  [95 %-98 %] 95 %  BP: (117-154)/(68-99) 123/76     Weight: 85 kg (187 lb 6.3 oz)  Body mass index is 31.18 kg/m².     Physical Exam  Constitutional:       General: She is not in acute distress.     Appearance: She is not diaphoretic.   HENT:      Head: Normocephalic and atraumatic.      Comments: EEG in place  Eyes:      General: No scleral icterus.     Extraocular Movements: Extraocular movements intact.   Pulmonary:      Effort: Pulmonary effort is normal. No respiratory distress.   Musculoskeletal:         General: No deformity or signs of injury.      Cervical back: Neck supple. No rigidity.   Skin:     General: Skin is warm and dry.   Neurological:      Mental Status: She is alert and oriented to person, place, and time.   Psychiatric:         Mood and Affect: Mood normal.         Speech: Speech normal.         Behavior: Behavior normal.            NEUROLOGICAL EXAMINATION:     MENTAL STATUS   Oriented to person, place, and time.   Attention: normal. Concentration: normal.   Speech: speech is normal   Level of consciousness: alert    CRANIAL NERVES        CN II-XII grossly intact        Significant Labs: All pertinent lab results  from the past 24 hours have been reviewed.    Significant Studies: I have reviewed all pertinent imaging results/findings within the past 24 hours.       [1]   Current Facility-Administered Medications   Medication Dose Route Frequency Provider Last Rate Last Admin    acetaminophen tablet 650 mg  650 mg Oral Q4H PRN Juan Massey MD   650 mg at 03/25/25 2022    albuterol inhaler 2 puff  2 puff Inhalation Q6H PRN Tila Bruno PA-C        artificial tears 0.5 % ophthalmic solution 1 drop  1 drop Both Eyes BID Juan Massey MD   1 drop at 03/26/25 0924    benzonatate capsule 100 mg  100 mg Oral TID PRN Tila Bruno PA-C        cetirizine tablet 5 mg  5 mg Oral Daily Tila Bruno PA-C   5 mg at 03/26/25 1112    docusate sodium capsule 100 mg  100 mg Oral BID PRN Tila Bruno PA-C        FLUoxetine capsule 30 mg  30 mg Oral Daily Juan Massey MD   30 mg at 03/26/25 0748    fluticasone propionate 50 mcg/actuation nasal spray 50 mcg  1 spray Each Nostril Daily PRN Juan Massey MD        hydroxychloroquine tablet 200 mg  200 mg Oral BID Juan Massey MD   200 mg at 03/26/25 0746    LORazepam injection 2 mg  2 mg Intravenous Q5 Min PRN Juan Massey MD        [START ON 3/27/2025] norethindrone tablet 0.35 mg - HOME MED  1 tablet Oral Daily Sam Ledesma MD        predniSONE tablet 10 mg  10 mg Oral Daily Juan Massey MD   10 mg at 03/26/25 0746    [START ON 3/28/2025] rimegepant [NURTEC] 75 mg odt - HOME MED  75 mg Oral Every other day Sam Ledesma MD        sodium chloride 0.9% flush 10 mL  10 mL Intravenous PRN Juan Massey MD   10 mL at 03/25/25 2058    sucralfate 100 mg/mL suspension 1 g  1 g Oral QID (AC & HS) Juan Massey MD   1 g at 03/26/25 1112    tamsulosin 24 hr capsule 0.4 mg  0.4 mg Oral Nightly Juan Massey MD   0.4 mg at 03/25/25 2022    ubrogepant [UBRELVY] 100 mg tablet - HOME MED  100 mg Oral Daily ALLISONN Sam Ledesma MD

## 2025-03-26 NOTE — PROCEDURES
EMU Monitoring    Date/Time: 3/26/2025 8:22 AM    Performed by: Kedar Spencer MD  Authorized by: Alexa Harry MD      VIDEO ELECTROENCEPHALOGRAM  REPORT    DATE OF SERVICE: 3/25/25-3/26/25  EEG NUMBER: EMU -1  REQUESTED BY:  Kamryn  LOCATION OF SERVICE:  [place]    METHODOLOGY   Electroencephalographic (EEG) recording is with electrodes placed according to the International 10-20 placement system.  Thirty two (32) channels of digital signal (sampling rate of 512/sec) including T1 and T2 was simultaneously recorded from the scalp and may include  EKG, EMG, and/or eye monitors.  Recording band pass was 0.1 to 512 hz.  Digital video recording of the patient is simultaneously recorded with the EEG.  The patient is instructed report clinical symptoms which may occur during the recording session.  EEG and video recording is stored and archived in digital format.  Activation procedures which include photic stimulation, hyperventilation and instructing patients to perform simple task are done in selected patients.   The EEG is displayed on a monitor screen and can be reviewed using different montages.  Computer assisted analysis is employed to detect spike and electrographic seizure activity.   The entire record is submitted for computer analysis.  The entire recording is visually reviewed and the times identified by computer analysis as being spikes or seizures are reviewed again.  Compresses spectral analysis (CSA) is also performed on the activity recorded from each individual channel.  This is displayed as a power display of frequencies from 0 to 30 Hz over time.   The CSA is reviewed looking for asymmetries in power between homologous areas of the scalp and then compared with the original EEG recording.     [employer] software was also utilized in the review of this study.  This software suite analyzes the EEG recording in multiple domains.  Coherence and rhythmicity is computed to identify EEG  sections which may contain organized seizures.  Each channel undergoes analysis to detect presence of spike and sharp waves which have special and morphological characteristic of epileptic activity.  The routine EEG recording is converted from spacial into frequency domain.  This is then displayed comparing homologous areas to identify areas of significant asymmetry.  Algorithm to identify non-cortically generated artifact is used to separate eye movement, EMG and other artifact from the EEG.      ELECTROENCEPHALOGRAM:    RECORDING TIMES:  Start on 3/25/25 at 12:30  Stop on 3//26/25 at 07:00    A total of 18 hrs of EEG recording was obtained.    The record shows a good  organization at rest, consisting of a 12  Hz posterior dominant rhythm with good  reactivity. There is mild bilateral beta activity.    Drowsiness is characterized by attenuation of the background, vertex waves, and bilateral theta slowing. Stage II sleep is characterized by slowing, vertex waves, and symmetric sleep spindles.     Provocative maneuvers including hyperventilation and photic stimulation were not performed.     EKG recording shows a sinus rhythm.    There is one push button or clinical event for flushing, difficulty swallowing, and chest pressure at 22:48. No epileptiform discharges or electrographic seizures are seen.     IMPRESSION:  Normal study.  One push button patient event with no electrographic correlate.     Kedar Spencer MD

## 2025-03-26 NOTE — ASSESSMENT & PLAN NOTE
Marcy Romo is a 34 y.o. female who is currently admitted for seizure-like events that began in adulthood and undergoing workup. She also currently being worked up by Dr. Leslie with urology for inability to urinate likely due to neurogenic bladder.    -- Patient seen and examined  -- Recommend antibiotic coverage for possible gram positive UTI; can tailor to sensitivities once culture results  -- PVR 0; no concerns for retention at this time  -- Recommend outpatient workup for gross hematuria; okay for this to be done with current urologist Dr. Leslie  -- Okay for patient to continue self-catheterization  -- Rest of care per primary    Urology will sign off. Please contact with any questions or concerns.

## 2025-03-26 NOTE — HOSPITAL COURSE
3/25>3/26: Admit to EMU, home Levetiracetam, Oxcarbazepine, Cenobamate, Clonazepam held on admission. Patient completed sleep deprivation overnight. No typical events captured since admission, EEG normal.   3/26>3/27: Typical events captured 3/26 at 13:37 and 3/27 approximately 07:30, no EEG correlate noted to either event, consistent with pyschogenic nonepileptic events. Discussed in detail with patient and significant other at bedside. Neuropsychology evaluated patient, multiple risk factors for conversion disorder identified, recommend weekly one-on-one therapy and couples therapy with SO. Discontinue Cenobamate, Clonazepam, Levetiracetam, Oxcarbazepine and Midazolam nasal spray as EEG normal throughout admission and no evidence of comorbid epilepsy. Discharged home in stable condition, follow up with Neurology as needed.

## 2025-03-26 NOTE — PLAN OF CARE
Problem: Adult Inpatient Plan of Care  Goal: Plan of Care Review  Outcome: Progressing  Goal: Patient-Specific Goal (Individualized)  Outcome: Progressing  Goal: Optimal Comfort and Wellbeing  Outcome: Progressing   POC reviewed and updated with the patient/dad. Questions regarding POC were encouraged and addressed with the patient.  VSS, see flow-sheets. Tele and continuous pulse ox maintained per provider's order. Continuous EEG in place. NAEON. Patient is AO X 4 at this time. Successful sleep deprivation per provider order. Patient may sleep from 1800 to 0400, then patient to remain awake. VS obtained. Interruptions minimized to promote sleep. Seizure, fall, and safety precautions maintained, no signs of injury noted during shift.  Upon exiting room, patient's bed locked in low position, side rails up x 3, bed alarm set, with call light within reach. Instructed patient/ dad to call staff for assistance, verbalized understanding.  No acute signs of distress noted at this time.

## 2025-03-26 NOTE — PLAN OF CARE
SW attempted at 5:19pm to complete DPA with pt at bedside. Pt was agitated and requested to speak with someone on her team or higher up regarding her current concerns as it regards her current health here. SARA informed pt SW will reach out to team to see if someone can come speak with her regarding matter (sent message via secure chat). SARA informed pt CM dept will attempt again at a later time.     PEDRITO Arizmendi   [facility]    Case Management Dept  [phone]

## 2025-03-26 NOTE — ASSESSMENT & PLAN NOTE
- Chronic, LFTs on admission stable/decreased from prior labs 3/2/25  - Followed by PCP, pending Hepatology appt

## 2025-03-27 ENCOUNTER — PATIENT MESSAGE (OUTPATIENT)
Dept: NEUROLOGY | Facility: HOSPITAL | Age: 35
End: 2025-03-27
Payer: MEDICAID

## 2025-03-27 VITALS
HEART RATE: 101 BPM | OXYGEN SATURATION: 95 % | DIASTOLIC BLOOD PRESSURE: 72 MMHG | HEIGHT: 65 IN | RESPIRATION RATE: 18 BRPM | WEIGHT: 187.38 LBS | SYSTOLIC BLOOD PRESSURE: 113 MMHG | TEMPERATURE: 98 F | BODY MASS INDEX: 31.22 KG/M2

## 2025-03-27 PROBLEM — N39.0 URINARY TRACT INFECTION: Status: ACTIVE | Noted: 2025-03-27

## 2025-03-27 PROBLEM — R56.9 SEIZURE-LIKE ACTIVITY: Status: ACTIVE | Noted: 2025-03-27

## 2025-03-27 PROBLEM — F44.9 CONVERSION DISORDER: Status: ACTIVE | Noted: 2024-03-19

## 2025-03-27 LAB — 10OH-CARBAZEPINE SERPL-MCNC: 7 MCG/ML (ref 10–35)

## 2025-03-27 PROCEDURE — 25000003 PHARM REV CODE 250

## 2025-03-27 PROCEDURE — 25000003 PHARM REV CODE 250: Performed by: PSYCHIATRY & NEUROLOGY

## 2025-03-27 PROCEDURE — 25000242 PHARM REV CODE 250 ALT 637 W/ HCPCS: Performed by: PSYCHIATRY & NEUROLOGY

## 2025-03-27 PROCEDURE — 94640 AIRWAY INHALATION TREATMENT: CPT

## 2025-03-27 PROCEDURE — S4993 CONTRACEPTIVE PILLS FOR BC: HCPCS | Performed by: PSYCHIATRY & NEUROLOGY

## 2025-03-27 PROCEDURE — 25000003 PHARM REV CODE 250: Performed by: PHYSICIAN ASSISTANT

## 2025-03-27 PROCEDURE — 99499 UNLISTED E&M SERVICE: CPT | Mod: ,,, | Performed by: PHYSICIAN ASSISTANT

## 2025-03-27 PROCEDURE — 90791 PSYCH DIAGNOSTIC EVALUATION: CPT | Mod: AH,HB,, | Performed by: CLINICAL NEUROPSYCHOLOGIST

## 2025-03-27 PROCEDURE — 99239 HOSP IP/OBS DSCHRG MGMT >30: CPT | Mod: ,,, | Performed by: PHYSICIAN ASSISTANT

## 2025-03-27 PROCEDURE — 94761 N-INVAS EAR/PLS OXIMETRY MLT: CPT

## 2025-03-27 PROCEDURE — 63600175 PHARM REV CODE 636 W HCPCS

## 2025-03-27 PROCEDURE — 95718 EEG PHYS/QHP 2-12 HR W/VEEG: CPT | Mod: ,,, | Performed by: PSYCHIATRY & NEUROLOGY

## 2025-03-27 RX ORDER — NITROFURANTOIN 25; 75 MG/1; MG/1
100 CAPSULE ORAL EVERY 12 HOURS
Qty: 8 CAPSULE | Refills: 0 | Status: SHIPPED | OUTPATIENT
Start: 2025-03-27

## 2025-03-27 RX ORDER — DIPHENHYDRAMINE HCL 50 MG
50 CAPSULE ORAL EVERY 6 HOURS PRN
Status: DISCONTINUED | OUTPATIENT
Start: 2025-03-27 | End: 2025-03-27 | Stop reason: HOSPADM

## 2025-03-27 RX ADMIN — HYPROMELLOSE 2910 1 DROP: 5 SOLUTION/ DROPS OPHTHALMIC at 08:03

## 2025-03-27 RX ADMIN — FLUOXETINE HYDROCHLORIDE 30 MG: 10 CAPSULE ORAL at 08:03

## 2025-03-27 RX ADMIN — IPRATROPIUM BROMIDE AND ALBUTEROL SULFATE 3 ML: 2.5; .5 SOLUTION RESPIRATORY (INHALATION) at 08:03

## 2025-03-27 RX ADMIN — NORETHINDRONE 0.35 MG: 0.35 TABLET ORAL at 09:03

## 2025-03-27 RX ADMIN — NITROFURANTOIN MONOHYDRATE/MACROCRYSTALS 100 MG: 25; 75 CAPSULE ORAL at 08:03

## 2025-03-27 RX ADMIN — ONDANSETRON 4 MG: 4 TABLET, ORALLY DISINTEGRATING ORAL at 08:03

## 2025-03-27 RX ADMIN — HYDROXYCHLOROQUINE SULFATE 200 MG: 200 TABLET, FILM COATED ORAL at 08:03

## 2025-03-27 RX ADMIN — DIPHENHYDRAMINE HYDROCHLORIDE 50 MG: 50 CAPSULE ORAL at 09:03

## 2025-03-27 RX ADMIN — PREDNISONE 10 MG: 5 TABLET ORAL at 08:03

## 2025-03-27 RX ADMIN — SUCRALFATE 1 G: 1 SUSPENSION ORAL at 08:03

## 2025-03-27 RX ADMIN — KETOROLAC TROMETHAMINE 15 MG: 30 INJECTION, SOLUTION INTRAMUSCULAR; INTRAVENOUS at 09:03

## 2025-03-27 RX ADMIN — IPRATROPIUM BROMIDE AND ALBUTEROL SULFATE 3 ML: 2.5; .5 SOLUTION RESPIRATORY (INHALATION) at 12:03

## 2025-03-27 RX ADMIN — IPRATROPIUM BROMIDE AND ALBUTEROL SULFATE 3 ML: 2.5; .5 SOLUTION RESPIRATORY (INHALATION) at 04:03

## 2025-03-27 NOTE — PLAN OF CARE
Problem: Adult Inpatient Plan of Care  Goal: Plan of Care Review  Outcome: Progressing  Goal: Patient-Specific Goal (Individualized)  Outcome: Progressing  Goal: Absence of Hospital-Acquired Illness or Injury  Outcome: Progressing  Goal: Optimal Comfort and Wellbeing  Outcome: Progressing  Goal: Readiness for Transition of Care  Outcome: Progressing     Problem: Infection  Goal: Absence of Infection Signs and Symptoms  Outcome: Progressing     Problem: Seizure, Active Management  Goal: Absence of Seizure/Seizure-Related Injury  Outcome: Progressing     Problem: UTI (Urinary Tract Infection)  Goal: Improved Infection Symptoms  Outcome: Progressing     Problem: Fall Injury Risk  Goal: Absence of Fall and Fall-Related Injury  Outcome: Progressing     Problem: Pain Acute  Goal: Optimal Pain Control and Function  Outcome: Progressing             POC reviewed and updated with the patient/significant other. Questions regarding POC were encouraged and addressed with the patient.  VSS, see flow-sheets. Tele and continuous pulse ox maintained per provider's order. Continuous EEG in place.  Patient is AO X 4 at this time. Seizure, fall, and safety precautions maintained, no signs of injury noted during shift.  Upon exiting room, patient's bed locked in low position, side rails up x 3, bed alarm refused, with call light within reach. Instructed patient/ significant other to call staff for assistance, verbalized understanding.  No acute signs of distress noted at this time.

## 2025-03-27 NOTE — CARE UPDATE
"RAPID RESPONSE NURSE CHART REVIEW        Chart Reviewed: 03/27/2025, 7:35 AM    MRN: [id number]  Bed: 904/904 A    Dx: Seizure-like activity    Marcy Romo has a past medical history of ADHD (attention deficit hyperactivity disorder), Allergy, Anxiety, Asthma, Depression, Hyperlipidemia, and Systemic lupus erythematosus, organ or system involvement unspecified.    Last VS: /75 (BP Location: Left arm, Patient Position: Lying)   Pulse (!) 148   Temp 99.4 °F (37.4 °C) (Oral)   Resp 18   Ht 5' 5" (1.651 m)   Wt 85 kg (187 lb 6.3 oz)   SpO2 (!) 94%   Breastfeeding No   BMI 31.18 kg/m²     24H Vital Sign Range:  Temp:  [97.9 °F (36.6 °C)-99.4 °F (37.4 °C)]   Pulse:  []   Resp:  [17-20]   BP: (117-149)/(68-87)   SpO2:  [94 %-100 %]     Level of Consciousness (AVPU): alert    Recent Labs     03/25/25  1133   WBC 8.96   HGB 14.4   HCT 43.2          Recent Labs     03/25/25  1133      K 4.4      CO2 27   BUN 10   CREATININE 0.7        OXYGEN:    MEWS score: 1    Contacted at 07:32 AM.    Bedside RN, Zina, contacted for tachycardia. RN reports she is at bedside assessing pt who is exhibiting seizure-like activity. States airway patent and spO2 94%. MD en route to bedside. Continuous EEG and continuous telemetry monitoring in place. PRN IV ativan ordered by primary team for seizures, GTC > 5 mins. RN states she does not need assistance at this time. Instructed to call [phone] for further concerns or assistance.    Katie Yousif RN       "

## 2025-03-27 NOTE — PLAN OF CARE
[facility] (H[facility])  Discharge Assessment    Primary Care Provider: Kaylie Ahuja MD     Discharge Assessment (most recent)       BRIEF DISCHARGE ASSESSMENT - 03/27/25 1112          Discharge Planning    Assessment Type Discharge Planning Brief Assessment     Resource/Environmental Concerns none     Support Systems Spouse/significant other     Equipment Currently Used at Home none     Current Living Arrangements home     Patient/Family Anticipates Transition to home with family     Patient/Family Anticipated Services at Transition none     DME Needed Upon Discharge  none     Discharge Plan A Home with family     Discharge Plan B Home with family
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
## 2025-03-27 NOTE — NURSING
Discharge info given. Questions answered S/O at bedside. Discharged to home. PIV discontinued. No pain or discomfort or distress noted at present

## 2025-03-27 NOTE — PLAN OF CARE
[facility] (Hospital)  Discharge Final Note    Primary Care Provider: Kaylie Ahuja MD    Expected Discharge Date: 3/27/2025    Patient to be discharged home. The patient does not have any home needs.  Family to provide transportation home.      Future Appointments   Date Time Provider Department Center   4/10/2025 11:30 AM Kaylie Ahuja MD [facility]   6/2/2025  2:00 PM [facility]        Final Discharge Note (most recent)       Final Note - 03/27/25 1238          Final Note    Assessment Type Final Discharge Note     Anticipated Discharge Disposition Home or Self Care        Post-Acute Status    Post-Acute Authorization Other     Other Status No Post-Acute Service Needs     Discharge Delays None known at this time                     Important Message from Medicare             Contact Info       Alexa Harry MD   Specialty: Neurology    [address] [facility] [address]   Phone: [phone]       Next Steps: Follow up    Instructions: Follow up as needed

## 2025-03-27 NOTE — ASSESSMENT & PLAN NOTE
- Urine culture from 3/25 with >100,000 cfu gram-negative rods  - Continue Nitrofurantoin 100 mg BID to complete 5 day course  - Outpatient follow up with Urology (Dr. Leslie) for further workup and management

## 2025-03-27 NOTE — ASSESSMENT & PLAN NOTE
- Previously evaluated by Urology (Dr. Leslie), records unavailable for review  - Utilizes straight cath PRN as outpatient, continue during admission  - Endorses increased blood during straight catheterization - Urology consulted 3/26, appreciate their assistance  - Continue Nitrofurantoin 100 mg BID x5 days  - Follow up with Dr. Leslie after discharge for further management
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
## 2025-03-27 NOTE — ASSESSMENT & PLAN NOTE
34F PMHx chronic headaches, elevated LFTs, urinary retention admitted to EMU for capture and characterization of events of headache, metallic taste, or visual aura, followed by behavioral arrest and bilateral limb shaking, at times associated with tongue biting or urinary incontinence. Patient reports events approximately every 1-2 weeks, with most recent event occurring four days prior to admission. Events can last up to 10-15 minutes. She is currently maintained on Levetiracetam 2000 mg BID, Oxcarbazepine 300 mg BID, and Cenobamate 12.5 mg daily. Prior vEEG showed no epileptiform activity. Brain imaging has been unrevealing.     - Discontinue vEEG - tpical events captured 3/26 at 13:37 and 3/27 approximately 07:30, no EEG correlate noted to either event, consistent with pyschogenic nonepileptic events. Discussed in detail with patient and significant other at bedside  - Neuropsychology evaluated patient, multiple risk factors for conversion disorder identified, recommend weekly one-on-one therapy and couples therapy with SO  - Discontinue Cenobamate, Clonazepam, Levetiracetam, Oxcarbazepine and Midazolam nasal spray as EEG normal throughout admission and no evidence of comorbid epilepsy  - Discharged home in stable condition, follow up with Neurology as needed

## 2025-03-27 NOTE — NURSING
0737 right hand tremors noted. SO pushed button to notified tech of seizure like activity. RN at bedside. Patient  noted to have deep breathing with Increased  depth. Pt moaning between breaths. Diaphoric and tachycardic 120-140's.Spo2 remained 94-98% on RA. Pt holding left jaw and clinching with urinary incontinence.Md notified @.47078.. EEG remains in place. Seizure like symptoms ended approximately 752. Pt states she was not conscious through the event. Pt cleaned and bed linens changed. Following commands and able to stand and sit up in chair trough bed change. Complaints of nausea and sensitivity to light after event. Zofran PO given. States she feels tired and needs to take a nap.

## 2025-03-27 NOTE — PROCEDURES
EMU Monitoring    Date/Time: 3/27/2025 8:36 AM    Performed by: Kedar Spencer MD  Authorized by: Juan Massey MD      VIDEO ELECTROENCEPHALOGRAM  REPORT    DATE OF SERVICE: 3/26/25-3/27/25  EEG NUMBER: EMU -[id number]  REQUESTED BY:  Kamryn  LOCATION OF SERVICE:  [facility]    METHODOLOGY   Electroencephalographic (EEG) recording is with electrodes placed according to the International 10-20 placement system.  Thirty two (32) channels of digital signal (sampling rate of 512/sec) including T1 and T2 was simultaneously recorded from the scalp and may include  EKG, EMG, and/or eye monitors.  Recording band pass was 0.1 to 512 hz.  Digital video recording of the patient is simultaneously recorded with the EEG.  The patient is instructed report clinical symptoms which may occur during the recording session.  EEG and video recording is stored and archived in digital format.  Activation procedures which include photic stimulation, hyperventilation and instructing patients to perform simple task are done in selected patients.   The EEG is displayed on a monitor screen and can be reviewed using different montages.  Computer assisted analysis is employed to detect spike and electrographic seizure activity.   The entire record is submitted for computer analysis.  The entire recording is visually reviewed and the times identified by computer analysis as being spikes or seizures are reviewed again.  Compresses spectral analysis (CSA) is also performed on the activity recorded from each individual channel.  This is displayed as a power display of frequencies from 0 to 30 Hz over time.   The CSA is reviewed looking for asymmetries in power between homologous areas of the scalp and then compared with the original EEG recording.     [employer] software was also utilized in the review of this study.  This software suite analyzes the EEG recording in multiple domains.  Coherence and rhythmicity is computed to identify EEG sections  which may contain organized seizures.  Each channel undergoes analysis to detect presence of spike and sharp waves which have special and morphological characteristic of epileptic activity.  The routine EEG recording is converted from spacial into frequency domain.  This is then displayed comparing homologous areas to identify areas of significant asymmetry.  Algorithm to identify non-cortically generated artifact is used to separate eye movement, EMG and other artifact from the EEG.      ELECTROENCEPHALOGRAM:    RECORDING TIMES:  Start on 3/26/25 at  07:00  Stop on 3/27/25 at 07:00    A total of 24 hrs of EEG recording was obtained.    The record shows a good  organization at rest, consisting of a 11 Hz posterior dominant rhythm with good  reactivity. There is mild bilateral beta activity.    Drowsiness is characterized by attenuation of the background, vertex waves, and bilateral theta slowing. Stage II sleep is characterized by slowing, vertex waves, and symmetric sleep spindles.     Provocative maneuvers including hyperventilation and photic stimulation were not performed.     EKG recording shows a sinus rhythm.    There is one push button or clinical event for 13:37 for staring and left arm shaking, right arm shaking, and then bilateral arm shaking with staring, unresponsiveness, hyperventilation./heavy breaking, pelvic thrusting, and whole body jerking. The patient is intermittently able to regard/make eye contact with the nurse during the episode, which ends at 13:39. No epileptiform discharges or electrographic seizures are seen during this time.     IMPRESSION:  Normal study.  One push button patient event with no electrographic correlate and is consistent with a psychogenic nonepileptic event.    Kedar Spencer MD

## 2025-03-28 LAB — BACTERIA UR CULT: ABNORMAL

## 2025-03-28 NOTE — PROCEDURES
24 hr. Video EEG Monitoring    Date/Time: 3/27/2025 9:42 PM    Performed by: Kedar Spencer MD  Authorized by: Chelsey Quezada MD      VIDEO ELECTROENCEPHALOGRAM  REPORT    DATE OF SERVICE: 3/27/25-3/27/25  EEG NUMBER: EMU -3  REQUESTED BY:  Kamryn  [place]  [facility] [place]    METHODOLOGY   Electroencephalographic (EEG) recording is with electrodes placed according to the International 10-20 placement system.  Thirty two (32) channels of digital signal (sampling rate of 512/sec) including T1 and T2 was simultaneously recorded from the scalp and may include  EKG, EMG, and/or eye monitors.  Recording band pass was 0.1 to 512 hz.  Digital video recording of the patient is simultaneously recorded with the EEG.  The patient is instructed report clinical symptoms which may occur during the recording session.  EEG and video recording is stored and archived in digital format.  Activation procedures which include photic stimulation, hyperventilation and instructing patients to perform simple task are done in selected patients.   The EEG is displayed on a monitor screen and can be reviewed using different montages.  Computer assisted analysis is employed to detect spike and electrographic seizure activity.   The entire record is submitted for computer analysis.  The entire recording is visually reviewed and the times identified by computer analysis as being spikes or seizures are reviewed again.  Compresses spectral analysis (CSA) is also performed on the activity recorded from each individual channel.  This is displayed as a power display of frequencies from 0 to 30 Hz over time.   The CSA is reviewed looking for asymmetries in power between homologous areas of the scalp and then compared with the original EEG recording.     [employer] software was also utilized in the review of this study.  This software suite analyzes the EEG recording in multiple domains.  Coherence and rhythmicity is computed to identify EEG  sections which may contain organized seizures.  Each channel undergoes analysis to detect presence of spike and sharp waves which have special and morphological characteristic of epileptic activity.  The routine EEG recording is converted from spacial into frequency domain.  This is then displayed comparing homologous areas to identify areas of significant asymmetry.  Algorithm to identify non-cortically generated artifact is used to separate eye movement, EMG and other artifact from the EEG.      ELECTROENCEPHALOGRAM:    RECORDING TIMES:  Start on 3/27/25 at  07:00  Stop on 3/27/25 at 10:00    A total of 3 hrs of EEG recording was obtained.    The record shows a good  organization at rest, consisting of a 11 Hz posterior dominant rhythm with good  reactivity. There is mild bilateral beta activity.    Drowsiness is characterized by attenuation of the background, vertex waves, and bilateral theta slowing. Stage II sleep is characterized by slowing, vertex waves, and symmetric sleep spindles.     Provocative maneuvers including hyperventilation and photic stimulation were not performed.     EKG recording shows a sinus rhythm.    There is one push button or clinical event for 07:19 and ending at 09:25 for staring and left arm shaking, right arm shaking, and then bilateral arm shaking with staring, unresponsiveness, hyperventilation./heavy breathiing, pelvic thrusting, and whole body jerking. No epileptiform discharges or electrographic seizures are seen during this time.     The patient also reports eye burning and tremulousness at 09:20, again with no EEG changes seen.    IMPRESSION:  Normal study.  Two push button patient events are seen with no electrographic correlate and are consistent with a psychogenic nonepileptic events.    Kedar Spencer MD

## 2025-03-28 NOTE — TELEPHONE ENCOUNTER
Macrobid 100 mg BID to complete 5 day course. Message sent to patient. Urine culture reviewed.     Tila Bruno PA-C

## 2025-04-28 ENCOUNTER — OFFICE VISIT (OUTPATIENT)
Dept: OPTOMETRY | Facility: CLINIC | Age: 35
End: 2025-04-28
Payer: MEDICAID

## 2025-04-28 ENCOUNTER — TELEPHONE (OUTPATIENT)
Dept: OPTOMETRY | Facility: CLINIC | Age: 35
End: 2025-04-28
Payer: MEDICAID

## 2025-04-28 DIAGNOSIS — H16.223 KERATOCONJUNCTIVITIS SICCA, NOT SPECIFIED AS SJOGREN'S, BILATERAL: Primary | ICD-10-CM

## 2025-04-28 PROCEDURE — G2211 COMPLEX E/M VISIT ADD ON: HCPCS | Mod: S$PBB,,, | Performed by: OPTOMETRIST

## 2025-04-28 PROCEDURE — 99214 OFFICE O/P EST MOD 30 MIN: CPT | Mod: S$PBB,,, | Performed by: OPTOMETRIST

## 2025-04-28 PROCEDURE — 99999 PR PBB SHADOW E&M-EST. PATIENT-LVL III: CPT | Mod: PBBFAC,,, | Performed by: OPTOMETRIST

## 2025-04-28 PROCEDURE — 99213 OFFICE O/P EST LOW 20 MIN: CPT | Mod: PBBFAC | Performed by: OPTOMETRIST

## 2025-04-28 PROCEDURE — 1159F MED LIST DOCD IN RCRD: CPT | Mod: CPTII,,, | Performed by: OPTOMETRIST

## 2025-04-28 RX ORDER — FLUOROMETHOLONE 1 MG/ML
1 SUSPENSION/ DROPS OPHTHALMIC 4 TIMES DAILY
Qty: 10 ML | Refills: 0 | Status: SHIPPED | OUTPATIENT
Start: 2025-04-28 | End: 2025-05-12

## 2025-04-28 NOTE — TELEPHONE ENCOUNTER
Pt. States vision is blurry, eyelid have been sticking to eyeball. Pt will be seen today.    ----- Message from Med Assistant Reid sent at 4/28/2025 12:45 PM CDT -----  Regarding: FW: Appt Request    ----- Message -----  From: Kathie Crowley  Sent: 4/28/2025  12:31 PM CDT  To: [facility] Staff  Subject: Appt Request                                     Type:  Needs Medical AdviceWho Called: Marcy DoyleSymptoms (please be specific): Left eye is hurting and no vision How long has patient had these symptoms:  3 days Pharmacy name and phone #:  Would the patient rather a call back or a response via [facility]? Call back or [facility] Call [facility] Number:  [phone] Information: Patient concerned because part of her cornea looks raised. Patient asking to be seen this week.

## 2025-04-28 NOTE — PROGRESS NOTES
HPI    Marcy Romo is a 34 y.o. female who comes in as an urgent due to blurry   vision and extreme dry eye.     Pt. States left eyelid is sticking to her eyeball and vision is blurry OU.  Pt. has history of corneal abrasions in left eye. Pt. has experienced   intermittent vertical diplopia while seeing at distance or watching tv. Pt   reports diplopia is still present when covering an eye. She notes it is   worse OS than OD.     (+OU)blurred vision(+)Headaches(+ vertical)diplopia  (--)flashes(+ OS)floaters(+)pain  (--)Itching(--)tearing(+)burning  (+)Dryness(--) OTC Drops(+)Photophobia    Gtt's:   Xiidra BID OU     Last edited by Jaylyn Ann, OD on 4/28/2025  3:39 PM.            Assessment /Plan     For exam results, see Encounter Report.    Keratoconjunctivitis sicca, not specified as Sjogren's, bilateral    Other orders  -     fluorometholone 0.1% (FML) 0.1 % DrpS; Place 1 drop into both eyes 4 (four) times daily. for 14 days  Dispense: 10 mL; Refill: 0      Educated pt on findings. Significant dry eye OU. Rx FML 1 gtt OU QID for 2 weeks. Continue Xiidra 1 gtt OU BID. Recommend additional ATs prn. If symptoms worsen or dont improve, RTC. Monitor 2-3 weeks.     Can repeat refraction at dry eye f/u.     Visit today included increased complexity associated with the care of the pt's ocular surface disease and managing the longitudinal care of the patient due to the serious and/or complex managed problem.       RTC in 2-3 weeks for dry eye f/u or sooner if needed.

## 2025-05-07 ENCOUNTER — TELEPHONE (OUTPATIENT)
Dept: NEUROSURGERY | Facility: CLINIC | Age: 35
End: 2025-05-07
Payer: MEDICAID

## 2025-05-19 ENCOUNTER — OFFICE VISIT (OUTPATIENT)
Dept: OPTOMETRY | Facility: CLINIC | Age: 35
End: 2025-05-19
Payer: MEDICAID

## 2025-05-19 DIAGNOSIS — H02.883 MEIBOMIAN GLAND DYSFUNCTION (MGD) OF BOTH EYES, UNSPECIFIED EYELID: ICD-10-CM

## 2025-05-19 DIAGNOSIS — H16.223 KERATOCONJUNCTIVITIS SICCA, NOT SPECIFIED AS SJOGREN'S, BILATERAL: Primary | ICD-10-CM

## 2025-05-19 DIAGNOSIS — M32.9 SYSTEMIC LUPUS ERYTHEMATOSUS, UNSPECIFIED SLE TYPE, UNSPECIFIED ORGAN INVOLVEMENT STATUS: ICD-10-CM

## 2025-05-19 DIAGNOSIS — H02.886 MEIBOMIAN GLAND DYSFUNCTION (MGD) OF BOTH EYES, UNSPECIFIED EYELID: ICD-10-CM

## 2025-05-19 PROCEDURE — G2211 COMPLEX E/M VISIT ADD ON: HCPCS | Mod: S$PBB,,, | Performed by: OPTOMETRIST

## 2025-05-19 PROCEDURE — 1159F MED LIST DOCD IN RCRD: CPT | Mod: CPTII,,, | Performed by: OPTOMETRIST

## 2025-05-19 PROCEDURE — 99999 PR PBB SHADOW E&M-EST. PATIENT-LVL III: CPT | Mod: PBBFAC,,, | Performed by: OPTOMETRIST

## 2025-05-19 PROCEDURE — 99213 OFFICE O/P EST LOW 20 MIN: CPT | Mod: PBBFAC | Performed by: OPTOMETRIST

## 2025-05-19 PROCEDURE — 99214 OFFICE O/P EST MOD 30 MIN: CPT | Mod: S$PBB,,, | Performed by: OPTOMETRIST

## 2025-05-19 RX ORDER — FOLIC ACID 1 MG/1
1000 TABLET ORAL
COMMUNITY
Start: 2025-05-08 | End: 2026-05-08

## 2025-05-19 RX ORDER — LEVOMEFOLATE/ALGAL OIL 7.5-90.314
1 CAPSULE ORAL
COMMUNITY
Start: 2025-04-29

## 2025-05-19 RX ORDER — PERFLUOROHEXYLOCTANE 1 MG/MG
1 SOLUTION OPHTHALMIC 4 TIMES DAILY
Qty: 3 ML | Refills: 11 | Status: SHIPPED | OUTPATIENT
Start: 2025-05-19 | End: 2026-05-19

## 2025-05-19 NOTE — PROGRESS NOTES
HPI    Last eye exam was 4/28/25 with Dr. Ann.  Patient here for 3 wk FRANTZ follow up. Patient states FML has made eyes feel   a little better but they are still very dry. Vision is still blurry. OS   feels like eyelid is scrapping her eye.     Gtts:  FML QID OU  Xiidra BID OU      Last edited by Jaylyn Ann, OD on 5/19/2025 10:35 AM.            Assessment /Plan     For exam results, see Encounter Report.    Keratoconjunctivitis sicca, not specified as Sjogren's, bilateral  -     perfluorohexyloctane, PF, (MIEBO, PF,) 100 % Drop; Place 1 drop into both eyes 4 (four) times daily.  Dispense: 3 mL; Refill: 11    Meibomian gland dysfunction (MGD) of both eyes, unspecified eyelid  -     perfluorohexyloctane, PF, (MIEBO, PF,) 100 % Drop; Place 1 drop into both eyes 4 (four) times daily.  Dispense: 3 mL; Refill: 11    Systemic lupus erythematosus, unspecified SLE type, unspecified organ involvement status      Educated pt on findings. SPK OS. Pt reports minimal improvement in dry eyes since starting gtt Tx. D/c FML. Continue xiidra 1 gtt OU BID. Will also Rx miebo 1 gtt OU BID-QID. Additional ATs prn. Monitor 3-6 weeks.     Visit today included increased complexity associated with the care of the pt's ocular surface disease and managing the longitudinal care of the patient due to the serious and/or complex managed problem.       RTC in 3-6 weeks for dry eye f/u or sooner if needed.

## 2025-05-23 ENCOUNTER — TELEPHONE (OUTPATIENT)
Dept: CARDIOLOGY | Facility: CLINIC | Age: 35
End: 2025-05-23
Payer: MEDICAID

## 2025-05-23 NOTE — TELEPHONE ENCOUNTER
----- Message from Katelyn sent at 5/23/2025 10:21 AM CDT -----  Contact: self  Type: Needs Medical AdviceWho Called:  the patient Best Call Back Number: [phone] Information: pt was told to schedule and a hospital f/u appt within a week, discharge date 05/20 from [facility], no referral on file, please  call

## 2025-05-23 NOTE — TELEPHONE ENCOUNTER
Spoke to pt and explained we are not taking new Medicaid at this time. Gave access line phone number

## 2025-07-23 ENCOUNTER — PATIENT MESSAGE (OUTPATIENT)
Dept: ADMINISTRATIVE | Facility: OTHER | Age: 35
End: 2025-07-23
Payer: MEDICAID

## 2025-07-29 PROBLEM — R74.8 ELEVATED LIVER ENZYMES: Status: RESOLVED | Noted: 2024-03-20 | Resolved: 2025-07-29

## 2025-07-31 ENCOUNTER — OFFICE VISIT (OUTPATIENT)
Dept: NEUROLOGY | Facility: CLINIC | Age: 35
End: 2025-07-31
Payer: MEDICAID

## 2025-07-31 VITALS
SYSTOLIC BLOOD PRESSURE: 108 MMHG | HEART RATE: 99 BPM | BODY MASS INDEX: 29.41 KG/M2 | HEIGHT: 65 IN | DIASTOLIC BLOOD PRESSURE: 72 MMHG | WEIGHT: 176.5 LBS

## 2025-07-31 DIAGNOSIS — G50.0 TRIGEMINAL NEURALGIA OF RIGHT SIDE OF FACE: Primary | ICD-10-CM

## 2025-07-31 PROCEDURE — 3074F SYST BP LT 130 MM HG: CPT | Mod: CPTII,,, | Performed by: STUDENT IN AN ORGANIZED HEALTH CARE EDUCATION/TRAINING PROGRAM

## 2025-07-31 PROCEDURE — 99999 PR PBB SHADOW E&M-EST. PATIENT-LVL V: CPT | Mod: PBBFAC,,, | Performed by: STUDENT IN AN ORGANIZED HEALTH CARE EDUCATION/TRAINING PROGRAM

## 2025-07-31 PROCEDURE — 99212 OFFICE O/P EST SF 10 MIN: CPT | Mod: S$PBB,,, | Performed by: STUDENT IN AN ORGANIZED HEALTH CARE EDUCATION/TRAINING PROGRAM

## 2025-07-31 PROCEDURE — 3078F DIAST BP <80 MM HG: CPT | Mod: CPTII,,, | Performed by: STUDENT IN AN ORGANIZED HEALTH CARE EDUCATION/TRAINING PROGRAM

## 2025-07-31 PROCEDURE — 99215 OFFICE O/P EST HI 40 MIN: CPT | Mod: PBBFAC | Performed by: STUDENT IN AN ORGANIZED HEALTH CARE EDUCATION/TRAINING PROGRAM

## 2025-07-31 PROCEDURE — 3008F BODY MASS INDEX DOCD: CPT | Mod: CPTII,,, | Performed by: STUDENT IN AN ORGANIZED HEALTH CARE EDUCATION/TRAINING PROGRAM

## 2025-07-31 PROCEDURE — 1159F MED LIST DOCD IN RCRD: CPT | Mod: CPTII,,, | Performed by: STUDENT IN AN ORGANIZED HEALTH CARE EDUCATION/TRAINING PROGRAM

## 2025-07-31 RX ORDER — CARBAMAZEPINE 100 MG/1
TABLET, EXTENDED RELEASE ORAL
Qty: 374 TABLET | Refills: 0 | Status: SHIPPED | OUTPATIENT
Start: 2025-07-31 | End: 2025-11-05

## 2025-07-31 NOTE — PROGRESS NOTES
[facility]  Epilepsy Clinic Progress Note      [facility] - NEUROLOGY 7TH FL  [facility], [place]    Date: 7/31/25  Patient Name: Marcy Romo   MRN: [id number]   PCP: Kaylie Ahuja  Referring Provider: No ref. provider found    Assessment/Plan:     Assessment & Plan    IMPRESSION:   Suspect trigeminal neuralgia based on reported symptoms of severe, episodic facial pain described as electric shock-like sensations. Differential includes TMJ.   Considered possibility of vascular compression of trigeminal nerve as etiology.   Noted surgical options available if medication management proves ineffective.    TRIGEMINAL NEURALGIA OF RIGHT SIDE OF FACE:   Explained trigeminal neuralgia, its potential causes, and typical symptoms including possible vascular compression of trigeminal nerve using visual aids.   Discontinued Trileptal (oxcarbazepine) 300 mg 3 times daily and switched to carbamazepine, which may be more effective for trigeminal neuralgia: 100 mg twice daily for 1 week, then increase to 200 mg twice daily.   Ordered MRI Brain with focus on trigeminal nerve area to evaluate for potential vascular compression.   Referred to neurologist subspecializing in headaches for ongoing management.   Follow up in 1-2 months to assess effectiveness of carbamazepine and review MRI results.   Contact office if carbamazepine is not effective in managing pain.          Of note, the patient was in our EMU earlier this year and diagnosed with conversion disorder.  She was taken off of all antiseizure medications, however, followed up with another neurologist who told her she had epilepsy based on an ambulatory study and resumed her antiseizure medications.  Given that our visit today was focused on her new headache/facial pain, I advised her to follow up with his other neurologists for management of her seizure-like episodes.    I completed education on seizure first aid and safety. I  recommended seizure precautions with regards to avoiding unsupervised water recreational activity or bathing in tubs, climbing or working at heights, operation of heavy or dangerous machinery, caution around fire and sources of high heat, as well as any other activity which could put a patient at danger in case of a seizure.  I also reviewed the [facility] law and recommended that the patient not drive for 6 months in the event of breakthrough seizures.    Doreen Harry MD  [facility]   Department of Neurology      Subjective:            Interval history 7/31/25:    History of Present Illness    Patient presents today for follow up of neurological symptoms. She reports recurrent episodes of severe right-sided facial pain characterized by electrical shock-like sensations. Pain is described as excruciating, primarily affecting the jaw, teeth, and right side of face. Episodes are becoming increasingly frequent, lasting from one to several hours. Pain is severe enough to cause teeth chattering at night and has resulted in a chipped tooth. The pain significantly affects speech, with recovery taking several days. She experiences pain while chewing and describes the episodes as intensely painful. Episodes occur intermittently but are progressively more common. She is currently in the recovery phase of a recent pain episode from the previous day. Associated symptoms include blurred vision on the right side and persistent right eye tearing, which she initially attributed to allergies. Multiple MRIs of the brain have been performed previously. Nerve induction studies have not been significantly helpful. She has a history of bulging C5 and C6 cervical discs and lumbar L5-S1 issues. She experienced a broken elbow in December. CT from May 20th showed a mass at the base of the skull. CT of spine showed an enlarged lymph node with asymmetric appearance of muscles, though the significance is unclear. She has  "previously received epidural shots in neck and back that were not effective. She takes Keppra, Trileptal 300 mg 3 times daily for mood stabilization, and Gabapentin for neuropathy. She reports taking Trileptal for mood management, noting that her moods have been "all over the place". She indicates the medication helps somewhat, but feels her condition is progressively worsening. She takes Gabapentin to manage neuropathic pain related to chronic lumbar and cervical disc issues. She denies any known allergies.      ROS:  10-point ROS is negative unless otherwise indicated in the HPI.          PAST MEDICAL HISTORY:  Past Medical History:   Diagnosis Date    ADHD (attention deficit hyperactivity disorder)     Allergy     Anxiety     Asthma     Depression     Hyperlipidemia     Systemic lupus erythematosus, organ or system involvement unspecified        PAST SURGICAL HISTORY:  Past Surgical History:   Procedure Laterality Date    APPENDECTOMY      HAND SURGERY Right     HYSTERECTOMY      KNEE ARTHROSCOPY W/ ACL RECONSTRUCTION         CURRENT MEDS:  Current Medications[1]    ALLERGIES:  Review of patient's allergies indicates:   Allergen Reactions    Aimovig autoinjector [erenumab-aooe] Blisters     Hives/blisters on scalp       Cymbalta [duloxetine] Other (See Comments)     "Makes me crazy"    Topamax [topiramate] Other (See Comments)     "Makes me crazy"    Tramadol Nausea And Vomiting       FAMILY HISTORY:  Family History   Problem Relation Name Age of Onset    Arthritis Mother      Alcohol abuse Mother      Drug abuse Mother      COPD Mother      Mental illness Mother      Hypertension Mother      Alcohol abuse Father      Heart disease Father      Diabetes Father         SOCIAL HISTORY:  Social History[2]       Objective:     Vitals:    07/31/25 1027   BP: 108/72   Patient Position: Sitting   Pulse: 99   Weight: 80 kg (176 lb 7.7 oz)   Height: 5' 5" (1.651 m)     General: NAD, well nourished   Eyes: no tearing, " discharge, no erythema   ENT: moist mucous membranes of the oral cavity, nares patent    Neck: Supple, full range of motion  Cardiovascular: Warm and well perfused, pulses equal and symmetrical  Lungs: Normal work of breathing, normal chest wall excursions  Skin: No rash, lesions, or breakdown on exposed skin  Psychiatry: Mood and affect are appropriate   Abdomen: soft, non tender, non distended  Extremeties: No cyanosis, clubbing or edema.    Neurological   MENTAL STATUS: Alert and oriented to person, place, and time. Attention and concentration within normal limits. Speech without dysarthria, able to name and repeat without difficulty. Recent and remote memory within normal limits     This note was generated with the assistance of ambient listening technology. Verbal consent was obtained by the patient and accompanying visitor(s) for the recording of patient appointment to facilitate this note. I attest to having reviewed and edited the generated note for accuracy, though some syntax or spelling errors may persist. Please contact the author of this note for any clarification.          [1]   Current Outpatient Medications   Medication Sig Dispense Refill    azelastine (ASTELIN) 137 mcg (0.1 %) nasal spray 1 spray (137 mcg total) by Nasal route 2 (two) times daily. 30 mL 3    carBAMazepine (TEGRETOL XR) 100 MG 12 hr tablet Take 1 tablet (100 mg total) by mouth 2 (two) times daily for 7 days, THEN 2 tablets (200 mg total) 2 (two) times daily. 374 tablet 0    celecoxib (CELEBREX) 200 MG capsule Take 1 capsule (200 mg total) by mouth daily as needed for Pain. 90 capsule 0    cenobamate (XCOPRI) 50 mg Tab Take 1 tablet by mouth once daily.      cholecalciferol, vitamin D3, (VITAMIN D3) 50 mcg (2,000 unit) Tab Take 1 tablet by mouth once daily.      DEPLIN, ALGAL OIL, 7.5-90.314 mg Cap 1 capsule.      diclofenac sodium (SOLARAZE) 3 % gel Apply 1 application  topically 2 (two) times a day. 100 g 2    doxycycline  (VIBRAMYCIN) 100 MG Cap Take 1 capsule (100 mg total) by mouth 2 (two) times daily. for 7 days 14 capsule 0    FLUoxetine 40 MG capsule Take 40 mg by mouth once daily.      fluticasone propionate (FLONASE) 50 mcg/actuation nasal spray 1 spray by Each Nostril route daily as needed for Allergies.      folic acid (FOLVITE) 1 MG tablet Take 1,000 mcg by mouth.      gabapentin (NEURONTIN) 300 MG capsule Take 300 mg by mouth 3 (three) times daily.      hydroxychloroquine (PLAQUENIL) 200 mg tablet Take 1 tablet (200 mg total) by mouth 2 (two) times daily. 180 tablet 0    hydrOXYzine pamoate (VISTARIL) 25 MG Cap Take 25-50 mg by mouth every evening. (Patient taking differently: Take  mg by mouth every evening.)      levETIRAcetam (KEPPRA) 1000 MG tablet Take 2,000 mg by mouth 2 (two) times daily.      lifitegrast (XIIDRA) 5 % Dpet Place 1 drop into both eyes 2 (two) times daily. 60 each 11    linaCLOtide (LINZESS) 72 mcg Cap capsule Take 1 capsule (72 mcg total) by mouth before breakfast. (Patient taking differently: Take 145 mcg by mouth before breakfast.) 90 capsule 0    lisdexamfetamine (VYVANSE) 30 MG capsule Take 30 mg by mouth every morning.      METHOTREXATE ORAL Take 20 mg by mouth once daily.      moxifloxacin (VIGAMOX) 0.5 % ophthalmic solution Place 1 drop into the left eye 4 (four) times daily. 3 mL 3    naproxen (NAPROSYN) 500 MG tablet Take 1 tablet (500 mg total) by mouth 2 (two) times daily as needed (pain). 30 tablet 0    NURTEC 75 mg odt Take 75 mg by mouth every other day.      omeprazole (PRILOSEC) 20 MG capsule Take 1 capsule (20 mg total) by mouth once daily. 90 capsule 0    ondansetron (ZOFRAN-ODT) 4 MG TbDL Take 1 tablet (4 mg total) by mouth every 8 (eight) hours as needed (nausea). 10 tablet 0    orphenadrine (NORFLEX) 100 mg tablet Take 100 mg by mouth 2 (two) times daily as needed.      pantoprazole (PROTONIX) 40 MG tablet Take 40 mg by mouth every morning.      perfluorohexyloctane, PF,  (MIEBO, PF,) 100 % Drop Place 1 drop into both eyes 4 (four) times daily. 3 mL 11    prazosin (MINIPRESS) 5 MG capsule Take 5 mg by mouth every evening. (Patient taking differently: Take 10 mg by mouth every evening.)      predniSONE (DELTASONE) 10 MG tablet Take 10 mg by mouth once daily.      rizatriptan (MAXALT) 10 MG tablet Take 10 mg by mouth daily as needed.      sucralfate (CARAFATE) 100 mg/mL suspension Take 10 mLs (1 g total) by mouth 4 (four) times daily before meals and nightly. 473 mL 1    tamsulosin (FLOMAX) 0.4 mg Cap Take 0.4 mg by mouth once daily.      tiZANidine (ZANAFLEX) 2 MG tablet Take 2 mg by mouth 3 (three) times daily.      UBRELVY 100 mg tablet Take by mouth.      VENTOLIN HFA 90 mcg/actuation inhaler Inhale 1-2 puffs into the lungs every 6 (six) hours as needed for Wheezing or Shortness of Breath.       No current facility-administered medications for this visit.   [2]   Social History  Tobacco Use    Smoking status: Never     Passive exposure: Past    Smokeless tobacco: Never   Substance Use Topics    Alcohol use: Not Currently    Drug use: Yes     Comment: THC

## 2025-07-31 NOTE — PATIENT INSTRUCTIONS
I think you might be having trigeminal neuralgia  We will get an updated MRI of your brain and try a medication called carbamazepine.    Decrease trileptal to 300 mg twice daily for 1 day, then 300 mg daily, then stop.  Start carbamazepine 100 mg twice daily for 1 week, then increase to 200 mg twice daily  Follow up after MRI

## 2025-08-03 ENCOUNTER — HOSPITAL ENCOUNTER (OUTPATIENT)
Dept: RADIOLOGY | Facility: HOSPITAL | Age: 35
Discharge: HOME | End: 2025-08-03
Payer: MEDICAID

## 2025-08-03 DIAGNOSIS — E04.1 THYROID NODULE: ICD-10-CM

## 2025-08-03 PROCEDURE — 76536 US EXAM OF HEAD AND NECK: CPT | Mod: 26,,, | Performed by: RADIOLOGY

## 2025-08-03 PROCEDURE — 76536 US EXAM OF HEAD AND NECK: CPT | Mod: TC

## 2025-08-13 ENCOUNTER — HOSPITAL ENCOUNTER (OUTPATIENT)
Dept: RADIOLOGY | Facility: HOSPITAL | Age: 35
Discharge: HOME | End: 2025-08-13
Attending: STUDENT IN AN ORGANIZED HEALTH CARE EDUCATION/TRAINING PROGRAM
Payer: MEDICAID

## 2025-08-13 DIAGNOSIS — G50.0 TRIGEMINAL NEURALGIA OF RIGHT SIDE OF FACE: ICD-10-CM

## 2025-08-13 PROCEDURE — 70553 MRI BRAIN STEM W/O & W/DYE: CPT | Mod: TC,PO

## 2025-08-13 PROCEDURE — 70553 MRI BRAIN STEM W/O & W/DYE: CPT | Mod: 26,,, | Performed by: RADIOLOGY

## 2025-08-13 PROCEDURE — 25500020 PHARM REV CODE 255: Mod: PO | Performed by: STUDENT IN AN ORGANIZED HEALTH CARE EDUCATION/TRAINING PROGRAM

## 2025-08-13 PROCEDURE — A9585 GADOBUTROL INJECTION: HCPCS | Mod: PO | Performed by: STUDENT IN AN ORGANIZED HEALTH CARE EDUCATION/TRAINING PROGRAM

## 2025-08-13 RX ORDER — GADOBUTROL 604.72 MG/ML
7.5 INJECTION INTRAVENOUS
Status: COMPLETED | OUTPATIENT
Start: 2025-08-13 | End: 2025-08-13

## 2025-08-13 RX ADMIN — GADOBUTROL 7.5 ML: 604.72 INJECTION INTRAVENOUS at 11:08

## 2025-08-26 ENCOUNTER — HOSPITAL ENCOUNTER (EMERGENCY)
Facility: HOSPITAL | Age: 35
Discharge: HOME | End: 2025-08-26
Attending: EMERGENCY MEDICINE
Payer: MEDICAID

## 2025-08-26 PROBLEM — R56.9 SEIZURE: Status: ACTIVE | Noted: 2025-08-26

## 2025-09-02 ENCOUNTER — TELEPHONE (OUTPATIENT)
Dept: OPTOMETRY | Facility: CLINIC | Age: 35
End: 2025-09-02
Payer: MEDICAID

## 2025-09-03 ENCOUNTER — OFFICE VISIT (OUTPATIENT)
Dept: OPHTHALMOLOGY | Facility: CLINIC | Age: 35
End: 2025-09-03
Payer: MEDICAID

## 2025-09-03 DIAGNOSIS — H57.12 EYE DISCOMFORT, LEFT: Primary | ICD-10-CM

## 2025-09-03 PROCEDURE — 99999 PR PBB SHADOW E&M-EST. PATIENT-LVL I: CPT | Mod: PBBFAC,,, | Performed by: OPHTHALMOLOGY

## 2025-09-03 PROCEDURE — 99211 OFF/OP EST MAY X REQ PHY/QHP: CPT | Mod: PBBFAC | Performed by: OPHTHALMOLOGY
